# Patient Record
Sex: MALE | Race: WHITE | Employment: FULL TIME | URBAN - METROPOLITAN AREA
[De-identification: names, ages, dates, MRNs, and addresses within clinical notes are randomized per-mention and may not be internally consistent; named-entity substitution may affect disease eponyms.]

---

## 2016-12-22 LAB — DIABETIC RETINOPATHY: NORMAL

## 2018-02-22 ENCOUNTER — OFFICE VISIT (OUTPATIENT)
Dept: ENDOCRINOLOGY | Age: 28
End: 2018-02-22

## 2018-02-22 VITALS
HEIGHT: 67 IN | HEART RATE: 92 BPM | SYSTOLIC BLOOD PRESSURE: 121 MMHG | BODY MASS INDEX: 25.58 KG/M2 | DIASTOLIC BLOOD PRESSURE: 78 MMHG | WEIGHT: 163 LBS

## 2018-02-22 DIAGNOSIS — F32.A DEPRESSION, UNSPECIFIED DEPRESSION TYPE: ICD-10-CM

## 2018-02-22 DIAGNOSIS — E10.10 TYPE 1 DIABETES MELLITUS WITH KETOACIDOSIS WITHOUT COMA (HCC): ICD-10-CM

## 2018-02-22 DIAGNOSIS — E10.9 TYPE 1 DIABETES MELLITUS WITHOUT COMPLICATION (HCC): Primary | ICD-10-CM

## 2018-02-22 LAB — HBA1C MFR BLD: 10.1 %

## 2018-02-22 PROCEDURE — 83036 HEMOGLOBIN GLYCOSYLATED A1C: CPT | Performed by: INTERNAL MEDICINE

## 2018-02-22 PROCEDURE — 99215 OFFICE O/P EST HI 40 MIN: CPT | Performed by: INTERNAL MEDICINE

## 2018-02-22 ASSESSMENT — PATIENT HEALTH QUESTIONNAIRE - PHQ9
SUM OF ALL RESPONSES TO PHQ9 QUESTIONS 1 & 2: 0
1. LITTLE INTEREST OR PLEASURE IN DOING THINGS: 0
SUM OF ALL RESPONSES TO PHQ QUESTIONS 1-9: 0
2. FEELING DOWN, DEPRESSED OR HOPELESS: 0

## 2018-02-22 NOTE — PROGRESS NOTES
Floresita Andrade is a 24 y.o. male Pt is seen for the management of T 1 DM . 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 He has type 1 diabetes mellitus. MedicAlert identification noted. The initial diagnosis of diabetes was made 14 years ago. His disease course has been worsening. Hypoglycemia symptoms include nervousness/anxiousness, speech difficulty, sweats and tremors. Pertinent negatives for diabetes include no foot ulcerations, no polydipsia, no polyphagia, no polyuria, no visual change, no weakness and no weight loss. There are no hypoglycemic complications. Pertinent negatives for hypoglycemia complications include no nocturnal hypoglycemia, no required assistance and no required glucagon injection. Symptoms are worsening. Diabetic complications include peripheral neuropathy.  Risk factors for coronary artery disease include diabetes and Face: examination of head and face revealed no abnormalities  Eyes: no lid or conjunctival swelling, erythema or discharge, pupils are normal, equal, round, reactive to light  Ears/Nose: external inspection of ears and nose revealed no abnormalities, hearing is grossly normal  Oropharynx/Mouth/Face: lips, tongue and gums are normal with no lesions, the voice quality was normal  Neck: neck is supple and symmetric, with midline trachea and no masses, thyroid is normal  Lymphatics: normal cervical lymph nodes, normal supraclavicular nodes  Pulmonary: no increased work of breathing or signs of respiratory distress, lungs are clear to auscultation  Cardiovascular: normal heart rate and rhythm, normal S1 and S2, no murmurs and pedal pulses and 2+ bilaterally, No edema  Abdomen: abdomen is soft, non-tender with no masses  Musculoskeletal: normal gait and station and exam of the digits and nails are normal  Neurological: normal coordination and normal general cortical function    Visual inspection:  Deformity/amputation: absent  Skin lesions/pre-ulcerative calluses: absent  Edema: right- negative, left- negative    Sensory exam:  Monofilament sensation: normal  (minimum of 5 random plantar locations tested, avoiding callused areas - > 1 area with absence of sensation is + for neuropathy)    Plus at least one of the following:  Pulses: normal,   Pinprick: Intact  Proprioception: Intact  Vibration (128 Hz): Absent    Lab Results   Component Value Date    LABA1C 10.1 02/22/2018         ASSESSMENT/PLAN:  POORLY CONTROLLED TYPE 1 DIABETES A1C 14 >10 >>9.2>>10.5 >>8.6>>8.9>>10.1  I reviewed the insulin pump print outs including glycemic pattern at various times of the day. I also reviewed the insulin pump settings including basal rates, correction factors and meal carbohydrate coverage. Appropriate insulin pump adjustments were made to achieve optimal glycemic control.    since last visit he has not been checking his blood glucose as often as he did and his A1c has worsened from 8.9-10.1. He does have the new 670 G pump at home but he is still waiting on the sensors to be delivered once he gets those he will start wearing them together. Once he gets in the closed loop system I'm hopeful that his control will improve. He was advised to call back with his username and password for the [employer] system  Again we had a lengthy discussion about optimal control of diabetes   Hypoglycemia is not a major issue at this time   He will work towards better glycemia now and will reevaluate in 3 months   Also advised patient to check blood sugars before each meal, bedtime and any other time when a low blood sugar is suspected    EYE exam dec 2016 no retinopathy   Feet examined February 2018 minimal decreased in vibration sensation   Microalb/creat normal in nov 2016         VIt D Def level was 13 >>29 >>17>.23   Advised to  rx from pharmacy   He is on 5000 Iu daily advised to continue     DEPRESSION   He is not on meds any more and feels better now     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   He has been having fasiculation and twitching   He will see a neurologist             Reviewed and/or ordered clinical lab results Yes  Reviewed and/or ordered radiology tests Yes  Reviewed and/or ordered other diagnostic tests Yes  Made a decision to obtain old records Yes  Reviewed and summarized old records Yes      Katherin Armstrong was counseled regarding symptoms of current diagnosis, course and complications of disease if inadequately treated, side effects of medications, diagnosis, treatment options, and prognosis, risks, benefits, complications, and alternatives of treatment, labs, imaging and other studies and treatment targets and goals. He understands instructions and counseling. These diagnosis were discussed and reviewed with the patient including the advantages of drug therapy.  He was counseled at

## 2018-02-26 ENCOUNTER — TELEPHONE (OUTPATIENT)
Dept: ENDOCRINOLOGY | Age: 28
End: 2018-02-26

## 2018-02-26 ASSESSMENT — ENCOUNTER SYMPTOMS: VISUAL CHANGE: 0

## 2018-05-10 ENCOUNTER — TELEPHONE (OUTPATIENT)
Dept: ENDOCRINOLOGY | Age: 28
End: 2018-05-10

## 2018-05-22 ENCOUNTER — OFFICE VISIT (OUTPATIENT)
Dept: ENDOCRINOLOGY | Age: 28
End: 2018-05-22

## 2018-05-22 VITALS
WEIGHT: 170.6 LBS | HEART RATE: 83 BPM | OXYGEN SATURATION: 99 % | DIASTOLIC BLOOD PRESSURE: 86 MMHG | BODY MASS INDEX: 26.72 KG/M2 | SYSTOLIC BLOOD PRESSURE: 114 MMHG

## 2018-05-22 DIAGNOSIS — F32.A DEPRESSION, UNSPECIFIED DEPRESSION TYPE: ICD-10-CM

## 2018-05-22 DIAGNOSIS — E10.42 TYPE 1 DIABETES MELLITUS WITH DIABETIC POLYNEUROPATHY (HCC): Primary | ICD-10-CM

## 2018-05-22 DIAGNOSIS — E10.9 TYPE 1 DIABETES MELLITUS WITHOUT COMPLICATION (HCC): ICD-10-CM

## 2018-05-22 DIAGNOSIS — S14.3XXD INJURY OF LEFT BRACHIAL PLEXUS, SUBSEQUENT ENCOUNTER: ICD-10-CM

## 2018-05-22 DIAGNOSIS — Z79.4 ENCOUNTER FOR LONG-TERM (CURRENT) USE OF INSULIN (HCC): ICD-10-CM

## 2018-05-22 LAB
A/G RATIO: 1.5 (ref 1.1–2.2)
ALBUMIN SERPL-MCNC: 4.6 G/DL (ref 3.4–5)
ALP BLD-CCNC: 67 U/L (ref 40–129)
ALT SERPL-CCNC: 17 U/L (ref 10–40)
ANION GAP SERPL CALCULATED.3IONS-SCNC: 16 MMOL/L (ref 3–16)
AST SERPL-CCNC: 22 U/L (ref 15–37)
BILIRUB SERPL-MCNC: 2 MG/DL (ref 0–1)
BUN BLDV-MCNC: 11 MG/DL (ref 7–20)
CALCIUM SERPL-MCNC: 9.5 MG/DL (ref 8.3–10.6)
CHLORIDE BLD-SCNC: 104 MMOL/L (ref 99–110)
CHOLESTEROL, TOTAL: 150 MG/DL (ref 0–199)
CO2: 27 MMOL/L (ref 21–32)
CREAT SERPL-MCNC: 0.7 MG/DL (ref 0.9–1.3)
CREATININE URINE: 322.1 MG/DL (ref 39–259)
GFR AFRICAN AMERICAN: >60
GFR NON-AFRICAN AMERICAN: >60
GLOBULIN: 3 G/DL
GLUCOSE BLD-MCNC: 97 MG/DL (ref 70–99)
HDLC SERPL-MCNC: 38 MG/DL (ref 40–60)
LDL CHOLESTEROL CALCULATED: 95 MG/DL
MICROALBUMIN UR-MCNC: 2.8 MG/DL
MICROALBUMIN/CREAT UR-RTO: 8.7 MG/G (ref 0–30)
POTASSIUM SERPL-SCNC: 4.4 MMOL/L (ref 3.5–5.1)
SODIUM BLD-SCNC: 147 MMOL/L (ref 136–145)
TOTAL PROTEIN: 7.6 G/DL (ref 6.4–8.2)
TRIGL SERPL-MCNC: 85 MG/DL (ref 0–150)
VLDLC SERPL CALC-MCNC: 17 MG/DL

## 2018-05-22 PROCEDURE — 99215 OFFICE O/P EST HI 40 MIN: CPT | Performed by: INTERNAL MEDICINE

## 2018-05-22 RX ORDER — ACETAMINOPHEN 325 MG/1
650 TABLET ORAL EVERY 6 HOURS PRN
COMMUNITY

## 2018-05-22 ASSESSMENT — ENCOUNTER SYMPTOMS: VISUAL CHANGE: 0

## 2018-05-23 LAB
ESTIMATED AVERAGE GLUCOSE: 174.3 MG/DL
HBA1C MFR BLD: 7.7 %

## 2018-05-24 LAB — TSH, 3RD GENERATION: 1.06 MU/L (ref 0.3–4)

## 2018-05-26 LAB
VITAMIN D2 AND D3, TOTAL: 13.2 NG/ML (ref 30–80)
VITAMIN D2, 25 HYDROXY: <1 NG/ML
VITAMIN D3,25 HYDROXY: 13.2 NG/ML

## 2018-06-04 ENCOUNTER — TELEPHONE (OUTPATIENT)
Dept: ENDOCRINOLOGY | Age: 28
End: 2018-06-04

## 2018-09-24 ENCOUNTER — OFFICE VISIT (OUTPATIENT)
Dept: ENDOCRINOLOGY | Age: 28
End: 2018-09-24
Payer: COMMERCIAL

## 2018-09-24 VITALS
OXYGEN SATURATION: 99 % | HEIGHT: 67 IN | WEIGHT: 171 LBS | SYSTOLIC BLOOD PRESSURE: 120 MMHG | BODY MASS INDEX: 26.84 KG/M2 | DIASTOLIC BLOOD PRESSURE: 76 MMHG | HEART RATE: 112 BPM

## 2018-09-24 DIAGNOSIS — Z79.4 LONG-TERM INSULIN USE (HCC): ICD-10-CM

## 2018-09-24 DIAGNOSIS — E08.41 DIABETIC MONONEUROPATHY ASSOCIATED WITH DIABETES MELLITUS DUE TO UNDERLYING CONDITION (HCC): ICD-10-CM

## 2018-09-24 DIAGNOSIS — F32.0 CURRENT MILD EPISODE OF MAJOR DEPRESSIVE DISORDER WITHOUT PRIOR EPISODE (HCC): ICD-10-CM

## 2018-09-24 PROCEDURE — 99215 OFFICE O/P EST HI 40 MIN: CPT | Performed by: INTERNAL MEDICINE

## 2018-09-24 RX ORDER — LISINOPRIL 2.5 MG/1
2.5 TABLET ORAL DAILY
Qty: 30 TABLET | Refills: 3 | Status: SHIPPED | OUTPATIENT
Start: 2018-09-24 | End: 2019-04-29 | Stop reason: SDUPTHER

## 2018-09-24 RX ORDER — LISINOPRIL 2.5 MG/1
2.5 TABLET ORAL DAILY
Qty: 30 TABLET | Refills: 3 | Status: SHIPPED | OUTPATIENT
Start: 2018-09-24 | End: 2018-09-24 | Stop reason: SDUPTHER

## 2018-09-24 ASSESSMENT — ENCOUNTER SYMPTOMS: VISUAL CHANGE: 0

## 2018-09-24 NOTE — PROGRESS NOTES
Odin Scott. is a 34 y.o. male Pt is seen for the management of T 1 DM . Pt diagnosed with diabetes at age 17 years and has been wearing insulin pump since diagnosis He has a very strong family hx of T1 Diabetss His mom has T1 DM as well as his younger  brother who was diagnosed at age 6years of age. 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 He has type 1 diabetes mellitus. MedicAlert identification noted. The initial diagnosis of diabetes was made 14 years ago. His disease course has been worsening. Hypoglycemia symptoms include nervousness/anxiousness, speech difficulty, sweats and tremors. Pertinent negatives for diabetes include no foot ulcerations, no polydipsia, no polyphagia, no polyuria, no visual change, no weakness and no weight loss. There are no hypoglycemic complications. Pertinent negatives for hypoglycemia complications include no nocturnal hypoglycemia, no required assistance and no required glucagon injection. Symptoms are worsening. Diabetic complications include peripheral neuropathy. Risk factors for coronary artery disease include diabetes mellitus, dyslipidemia and male sex. Current diabetic treatment includes intensive insulin program. He is compliant with treatment some of the time. His weight is stable. He is following a diabetic diet. When asked about meal planning, he reported none. He has had a previous visit with a dietitian. He rarely participates in exercise. discussion about optimal control of diabetes   Hypoglycemia is not a major issue at this time   He will work towards better glycemia now and will reevaluate in 3 months   Also advised patient to check blood sugars before each meal, bedtime and any other time when a low blood sugar is suspected. Again advised patient to take me boluses at least 10 minutes prior to eating the meal     Hypoglycemia protocol reviewed in detail with patient Patient was advised to carry glucose tablets and also have glucagon emergency kit. Provided with RX for glucagon. Patient was advised that sending of his fingerstick blood glucose logs is crucial in management of his  diabetes. I will adjust the  dose of insulin according to sent data. Health Maintenance   - Blanca Never. was advised to have annual dilated eye exam     Last Eye Exam: dec 2016  he was told he has no retinopathy his ophthalmologist is their cousin --- advised him to make a follow-up appointment ASAP   Last Podiatry Exam:  I examined his feet in feb 2018 he has mild distal neuropathy   Lipids: At target in 2017   Microalbumin/Creatinine Ratio: normal May 2018    . Education: Reviewed ABCs of diabetes management (respective goals in parentheses): A1C (<7), blood pressure (<130/80), and cholesterol (LDL <100). -. Compliance at present is estimated to be fair. Efforts to improve compliance (if necessary) will be directed at dietary modifications: advised again .  -. Follow up: I recommend diabetes care be 3 months.     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 Please notify me in case of any questions about the content of this document, as some errors in transcription may have occurred . Eliezer Lopez. was counseled regarding symptoms of current diagnosis, course and complications of disease if inadequately treated, side effects of medications, diagnosis, treatment options, and prognosis, risks, benefits, complications, and alternatives of treatment, labs, imaging and other studies and treatment targets and goals. He understands instructions and counseling. These diagnosis were discussed and reviewed with the patient including the advantages of drug therapy. He was counseled at this visit on the following: diabetes complication prevention and foot care. Return in about 3 months (around 12/24/2018).

## 2018-09-26 ENCOUNTER — TELEPHONE (OUTPATIENT)
Dept: ENDOCRINOLOGY | Age: 28
End: 2018-09-26

## 2018-09-28 ENCOUNTER — TELEPHONE (OUTPATIENT)
Dept: ENDOCRINOLOGY | Age: 28
End: 2018-09-28

## 2019-01-10 DIAGNOSIS — E10.42 TYPE 1 DIABETES MELLITUS WITH DIABETIC POLYNEUROPATHY (HCC): ICD-10-CM

## 2019-01-10 LAB
A/G RATIO: 2 (ref 1.1–2.2)
ALBUMIN SERPL-MCNC: 5.1 G/DL (ref 3.4–5)
ALP BLD-CCNC: 94 U/L (ref 40–129)
ALT SERPL-CCNC: 24 U/L (ref 10–40)
ANION GAP SERPL CALCULATED.3IONS-SCNC: 15 MMOL/L (ref 3–16)
AST SERPL-CCNC: 15 U/L (ref 15–37)
BILIRUB SERPL-MCNC: 1.4 MG/DL (ref 0–1)
BUN BLDV-MCNC: 15 MG/DL (ref 7–20)
CALCIUM SERPL-MCNC: 10 MG/DL (ref 8.3–10.6)
CHLORIDE BLD-SCNC: 98 MMOL/L (ref 99–110)
CHOLESTEROL, TOTAL: 172 MG/DL (ref 0–199)
CO2: 25 MMOL/L (ref 21–32)
CREAT SERPL-MCNC: 0.8 MG/DL (ref 0.9–1.3)
CREATININE URINE: 109.1 MG/DL (ref 39–259)
GFR AFRICAN AMERICAN: >60
GFR NON-AFRICAN AMERICAN: >60
GLOBULIN: 2.6 G/DL
GLUCOSE BLD-MCNC: 264 MG/DL (ref 70–99)
HDLC SERPL-MCNC: 39 MG/DL (ref 40–60)
LDL CHOLESTEROL CALCULATED: 120 MG/DL
MICROALBUMIN UR-MCNC: <1.2 MG/DL
MICROALBUMIN/CREAT UR-RTO: NORMAL MG/G (ref 0–30)
POTASSIUM SERPL-SCNC: 4.4 MMOL/L (ref 3.5–5.1)
SODIUM BLD-SCNC: 138 MMOL/L (ref 136–145)
TOTAL PROTEIN: 7.7 G/DL (ref 6.4–8.2)
TRIGL SERPL-MCNC: 66 MG/DL (ref 0–150)
TSH SERPL DL<=0.05 MIU/L-ACNC: 1.16 UIU/ML (ref 0.27–4.2)
VITAMIN D 25-HYDROXY: 11.1 NG/ML
VLDLC SERPL CALC-MCNC: 13 MG/DL

## 2019-01-11 LAB
ESTIMATED AVERAGE GLUCOSE: 243.2 MG/DL
HBA1C MFR BLD: 10.1 %

## 2019-01-14 ENCOUNTER — OFFICE VISIT (OUTPATIENT)
Dept: ENDOCRINOLOGY | Age: 29
End: 2019-01-14
Payer: COMMERCIAL

## 2019-01-14 VITALS
HEART RATE: 94 BPM | BODY MASS INDEX: 27.15 KG/M2 | DIASTOLIC BLOOD PRESSURE: 80 MMHG | OXYGEN SATURATION: 99 % | WEIGHT: 173 LBS | SYSTOLIC BLOOD PRESSURE: 122 MMHG | HEIGHT: 67 IN

## 2019-01-14 DIAGNOSIS — F32.0 CURRENT MILD EPISODE OF MAJOR DEPRESSIVE DISORDER WITHOUT PRIOR EPISODE (HCC): ICD-10-CM

## 2019-01-14 DIAGNOSIS — E78.2 MIXED HYPERLIPIDEMIA: ICD-10-CM

## 2019-01-14 DIAGNOSIS — E55.9 VITAMIN D DEFICIENCY: ICD-10-CM

## 2019-01-14 PROCEDURE — 99215 OFFICE O/P EST HI 40 MIN: CPT | Performed by: INTERNAL MEDICINE

## 2019-01-14 RX ORDER — ERGOCALCIFEROL 1.25 MG/1
50000 CAPSULE ORAL WEEKLY
Qty: 12 CAPSULE | Refills: 1 | Status: SHIPPED | OUTPATIENT
Start: 2019-01-14 | End: 2019-04-29 | Stop reason: SDUPTHER

## 2019-01-14 ASSESSMENT — ENCOUNTER SYMPTOMS: VISUAL CHANGE: 0

## 2019-01-15 RX ORDER — LANCING DEVICE/LANCETS
KIT MISCELLANEOUS
Qty: 1 EACH | Refills: 1 | Status: SHIPPED | OUTPATIENT
Start: 2019-01-15 | End: 2022-02-16

## 2019-04-25 DIAGNOSIS — E78.2 MIXED HYPERLIPIDEMIA: ICD-10-CM

## 2019-04-25 DIAGNOSIS — E55.9 VITAMIN D DEFICIENCY: ICD-10-CM

## 2019-04-25 DIAGNOSIS — E10.42 TYPE 1 DIABETES MELLITUS WITH DIABETIC POLYNEUROPATHY (HCC): ICD-10-CM

## 2019-04-25 LAB
A/G RATIO: 1.5 (ref 1.1–2.2)
ALBUMIN SERPL-MCNC: 5 G/DL (ref 3.4–5)
ALP BLD-CCNC: 82 U/L (ref 40–129)
ALT SERPL-CCNC: 31 U/L (ref 10–40)
ANION GAP SERPL CALCULATED.3IONS-SCNC: 21 MMOL/L (ref 3–16)
AST SERPL-CCNC: 23 U/L (ref 15–37)
BILIRUB SERPL-MCNC: 1.4 MG/DL (ref 0–1)
BUN BLDV-MCNC: 11 MG/DL (ref 7–20)
CALCIUM SERPL-MCNC: 9.7 MG/DL (ref 8.3–10.6)
CHLORIDE BLD-SCNC: 97 MMOL/L (ref 99–110)
CHOLESTEROL, TOTAL: 167 MG/DL (ref 0–199)
CO2: 22 MMOL/L (ref 21–32)
CREAT SERPL-MCNC: 0.7 MG/DL (ref 0.9–1.3)
CREATININE URINE: 143 MG/DL (ref 39–259)
GFR AFRICAN AMERICAN: >60
GFR NON-AFRICAN AMERICAN: >60
GLOBULIN: 3.4 G/DL
GLUCOSE BLD-MCNC: 174 MG/DL (ref 70–99)
HDLC SERPL-MCNC: 42 MG/DL (ref 40–60)
LDL CHOLESTEROL CALCULATED: 107 MG/DL
MICROALBUMIN UR-MCNC: <1.2 MG/DL
MICROALBUMIN/CREAT UR-RTO: NORMAL MG/G (ref 0–30)
POTASSIUM SERPL-SCNC: 3.5 MMOL/L (ref 3.5–5.1)
SODIUM BLD-SCNC: 140 MMOL/L (ref 136–145)
TOTAL PROTEIN: 8.4 G/DL (ref 6.4–8.2)
TRIGL SERPL-MCNC: 89 MG/DL (ref 0–150)
TSH SERPL DL<=0.05 MIU/L-ACNC: 1.15 UIU/ML (ref 0.27–4.2)
VITAMIN D 25-HYDROXY: 51.1 NG/ML
VLDLC SERPL CALC-MCNC: 18 MG/DL

## 2019-04-26 LAB
ESTIMATED AVERAGE GLUCOSE: 228.8 MG/DL
HBA1C MFR BLD: 9.6 %

## 2019-04-27 LAB — TSH, 3RD GENERATION: 1.21 MU/L (ref 0.3–4)

## 2019-04-29 ENCOUNTER — OFFICE VISIT (OUTPATIENT)
Dept: ENDOCRINOLOGY | Age: 29
End: 2019-04-29
Payer: COMMERCIAL

## 2019-04-29 VITALS
DIASTOLIC BLOOD PRESSURE: 88 MMHG | HEIGHT: 67 IN | HEART RATE: 92 BPM | OXYGEN SATURATION: 99 % | WEIGHT: 175 LBS | SYSTOLIC BLOOD PRESSURE: 134 MMHG | BODY MASS INDEX: 27.47 KG/M2

## 2019-04-29 DIAGNOSIS — Z79.4 LONG-TERM INSULIN USE (HCC): ICD-10-CM

## 2019-04-29 DIAGNOSIS — E55.9 VITAMIN D DEFICIENCY: ICD-10-CM

## 2019-04-29 LAB
VITAMIN D2 AND D3, TOTAL: 70.2 NG/ML (ref 30–80)
VITAMIN D2, 25 HYDROXY: 65.5 NG/ML
VITAMIN D3,25 HYDROXY: 4.7 NG/ML

## 2019-04-29 PROCEDURE — 99214 OFFICE O/P EST MOD 30 MIN: CPT | Performed by: INTERNAL MEDICINE

## 2019-04-29 RX ORDER — LISINOPRIL 2.5 MG/1
2.5 TABLET ORAL DAILY
Qty: 90 TABLET | Refills: 3 | Status: SHIPPED | OUTPATIENT
Start: 2019-04-29 | End: 2020-07-09

## 2019-04-29 RX ORDER — ERGOCALCIFEROL 1.25 MG/1
50000 CAPSULE ORAL WEEKLY
Qty: 12 CAPSULE | Refills: 3 | Status: SHIPPED | OUTPATIENT
Start: 2019-04-29 | End: 2020-07-20 | Stop reason: SDUPTHER

## 2019-04-29 RX ORDER — ONDANSETRON 4 MG/1
4 TABLET, FILM COATED ORAL EVERY 8 HOURS PRN
COMMUNITY

## 2019-04-29 ASSESSMENT — ENCOUNTER SYMPTOMS: VISUAL CHANGE: 0

## 2019-04-29 NOTE — PROGRESS NOTES
Aftab Solis is a 34 y.o. male  seen for the management of T 1 DM . Pt diagnosed with diabetes at age 17 years and has been wearing insulin pump since diagnosis He has a very strong family hx of T1 Diabetss His mom has T1 DM as well as his younger  brother who was diagnosed at age 6years of age. 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 He has type 1 diabetes mellitus. MedicAlert identification noted. The initial diagnosis of diabetes was made 14 years ago. His disease course has been worsening. Hypoglycemia symptoms include nervousness/anxiousness, speech difficulty, sweats and tremors. Pertinent negatives for diabetes include no foot ulcerations, no polydipsia, no polyphagia, no polyuria, no visual change, no weakness and no weight loss. There are no hypoglycemic complications. Pertinent negatives for hypoglycemia complications include no nocturnal hypoglycemia, no required assistance and no required glucagon injection. Symptoms are worsening. Diabetic complications include peripheral neuropathy. Risk factors for coronary artery disease include diabetes mellitus, dyslipidemia and male sex. Current diabetic treatment includes intensive insulin program. He is compliant with treatment some of the time. His weight is stable. He is following a diabetic diet. When asked about meal planning, he reported none. He has had a previous visit with a dietitian.  He Not on file   Social Needs    Financial resource strain: Not on file    Food insecurity:     Worry: Not on file     Inability: Not on file    Transportation needs:     Medical: Not on file     Non-medical: Not on file   Tobacco Use    Smoking status: Passive Smoke Exposure - Never Smoker    Smokeless tobacco: Never Used    Tobacco comment: former smoker    Substance and Sexual Activity    Alcohol use: Yes     Comment: occ    Drug use: No    Sexual activity: Not on file   Lifestyle    Physical activity:     Days per week: Not on file     Minutes per session: Not on file    Stress: Not on file   Relationships    Social connections:     Talks on phone: Not on file     Gets together: Not on file     Attends Yarsani service: Not on file     Active member of club or organization: Not on file     Attends meetings of clubs or organizations: Not on file     Relationship status: Not on file    Intimate partner violence:     Fear of current or ex partner: Not on file     Emotionally abused: Not on file     Physically abused: Not on file     Forced sexual activity: Not on file   Other Topics Concern    Not on file   Social History Narrative    Not on file     Family History   Problem Relation Age of Onset    Diabetes Mother     Thyroid Disease Mother     Anxiety Disorder Mother     Cancer Mother         ovarian cancer    Other Mother         blood clot    Depression Father     Other Sister         GERD    Cancer Maternal Grandmother     Arthritis Maternal Grandmother     Anxiety Disorder Maternal Grandfather     High Blood Pressure Maternal Grandfather     Depression Maternal Grandfather     Anxiety Disorder Sister     Diabetes Sister      Current Outpatient Medications   Medication Sig Dispense Refill    Pyridoxine HCl (VITAMIN B6 PO) Take 1 tablet by mouth daily      Cyanocobalamin (VITAMIN B12 PO) Take 1 tablet by mouth daily      MAGNESIUM PO Take 1 tablet by mouth daily      L-methylfolate Calcium 15 MG TABS 0.5 tab daily  1    MELATONIN PO Take 1 tablet by mouth nightly      ondansetron (ZOFRAN) 4 MG tablet Take 4 mg by mouth every 8 hours as needed for Nausea or Vomiting      lisinopril (ZESTRIL) 2.5 MG tablet Take 1 tablet by mouth daily 90 tablet 3    vitamin D (ERGOCALCIFEROL) 30417 units CAPS capsule Take 1 capsule by mouth once a week 12 capsule 3    [employer] (ONE TOUCH DELICA LANCING DEV) MISC To check fingerstick blood glucose 1 each 1    acetaminophen (TYLENOL) 325 MG tablet Take 650 mg by mouth every 6 hours as needed for Pain      insulin aspart (NOVOLOG) 100 UNIT/ML injection vial Inject 150 units via insulin pump 9 vial 3    SELWYN CONTOUR NEXT TEST strip Test 6 times daily 550 strip 1    gabapentin (NEURONTIN) 600 MG tablet Take 600 mg by mouth 3 times daily      methocarbamol (ROBAXIN) 750 MG tablet Take 500 mg by mouth 4 times daily        No current facility-administered medications for this visit.       Allergies   Allergen Reactions    Citalopram      Worsening depression     Family Status   Relation Name Status    Mother  (Not Specified)    Father  (Not Specified)    Sister  (Not Specified)    MGM  (Not Specified)    MGF  (Not Specified)    Sister  (Not Specified)       Review of Systems  Constitutional: no fatigue, no fever, no recent weight gain, no recent weight loss, no changes in appetite  Eyes: no eye pain, no change in vision, no eye redness, no eye irritation, no double vision  Ears, nose, throat: no nasal congestion, no sore throat, no earache, no decrease in hearing, no hoarseness, no dry mouth, no sinus problems, no difficulty swallowing, no neck lumps, no dental problems, no mouth sores, no ringing in ears  Pulmonary: no shortness of breath, no wheezing, no dyspnea on exertion, no cough  Cardiovascular: no chest pain, no lower extremity edema, no orthopnea, no intermittent leg claudication, no palpitations  Gastrointestinal: no lips, tongue and gums are normal with no lesions, the voice quality was normal  Neck: neck is supple and symmetric, with midline trachea and no masses, thyroid is normal  Lymphatics: normal cervical lymph nodes, normal supraclavicular nodes  Pulmonary: no increased work of breathing or signs of respiratory distress, lungs are clear to auscultation  Cardiovascular: normal heart rate and rhythm, normal S1 and S2, no murmurs and pedal pulses and 2+ bilaterally, No edema  Abdomen: abdomen is soft, non-tender with no masses  Musculoskeletal: normal gait and station and exam of the digits and nails are normal  Neurological: normal coordination and normal general cortical function      Lab Results   Component Value Date    LABA1C 9.6 04/25/2019    LABA1C 10.1 01/10/2019    LABA1C 7.7 05/22/2018     Visual inspection:  Deformity/amputation: absent  Skin lesions/pre-ulcerative calluses: absent  Edema: right- negative, left- negative    Sensory exam:  Monofilament sensation: normal  (minimum of 5 random plantar locations tested, avoiding callused areas - > 1 area with absence of sensation is + for neuropathy)    Plus at least one of the following:  Pulses: normal,   Pinprick: Intact  Proprioception: Intact  Vibration (128 Hz): Intact    ASSESSMENT/PLAN:  POORLY CONTROLLED TYPE 1 DIABETES A1C 14 >10 >>9.2>>10.5>> >>8.6>>8.9>>10.1>>7.7>>9.0>>10.1>>9.6  I reviewed the insulin pump print outs including glycemic pattern at various times of the day. I also reviewed the insulin pump settings including basal rates, correction factors and meal carbohydrate coverage. Appropriate insulin pump adjustments were made to achieve optimal glycemic control  I have increased all his basal  rates Since he has not been wearing his sensor and is not in the auto mode he feels that he has been kicked out of automode due to highs .  Again we had a lengthy discussion about optimal control of diabetes   Hypoglycemia is not a major issue at this time twitching     Reviewed and/or ordered clinical lab results Yes  Reviewed and/or ordered radiology tests Yes  Reviewed and/or ordered other diagnostic tests Yes  Made a decision to obtain old records Yes  Reviewed and summarized old records Yes    Please note that some or all of this report was generated using voice recognition software. Please notify me in case of any questions about the content of this document, as some errors in transcription may have occurred . Betsy Divya was counseled regarding symptoms of current diagnosis, course and complications of disease if inadequately treated, side effects of medications, diagnosis, treatment options, and prognosis, risks, benefits, complications, and alternatives of treatment, labs, imaging and other studies and treatment targets and goals. He understands instructions and counseling. These diagnosis were discussed and reviewed with the patient including the advantages of drug therapy. He was counseled at this visit on the following: diabetes complication prevention and foot care. I spent 40   minutes with patient and more than 50 % of this time was spent discussing and providing counseling and coordinating care of patient's multiple health issues    Return in about 3 months (around 7/29/2019).

## 2019-05-22 ENCOUNTER — TELEPHONE (OUTPATIENT)
Dept: ENDOCRINOLOGY | Age: 29
End: 2019-05-22

## 2019-05-22 NOTE — TELEPHONE ENCOUNTER
Advised him to increase his boluses and set up a higher temp basal rate until fingerstick blood glucose get better   He already is feeling better

## 2019-05-22 NOTE — TELEPHONE ENCOUNTER
Pt called to see if he could speak to Nurse KILLIAN or Dr. Marcia Miguel. Pt's PCP put him on a steroid and it's causing his blood sugars to become to high. Pt states that he's having trouble getting them out of the 500 range. Informed patient that they will get a call back from nurse to disucss.

## 2019-05-22 NOTE — TELEPHONE ENCOUNTER
Pt was having an allergic reaction, red rash on half of his face. Given injection in pcp office, unsure of med name. Today blood glucose 553, 397, and 505. Pt is on taper of prednisone 10mg starting with 6 tabs yesterday and will take one less tablet each day. Pt does not have any other symptoms.

## 2019-08-08 ENCOUNTER — OFFICE VISIT (OUTPATIENT)
Dept: ENDOCRINOLOGY | Age: 29
End: 2019-08-08
Payer: COMMERCIAL

## 2019-08-08 VITALS
OXYGEN SATURATION: 99 % | HEIGHT: 67 IN | BODY MASS INDEX: 27.94 KG/M2 | HEART RATE: 95 BPM | DIASTOLIC BLOOD PRESSURE: 86 MMHG | SYSTOLIC BLOOD PRESSURE: 104 MMHG | WEIGHT: 178 LBS

## 2019-08-08 DIAGNOSIS — S14.3XXA INJURY OF LEFT BRACHIAL PLEXUS, INITIAL ENCOUNTER: ICD-10-CM

## 2019-08-08 DIAGNOSIS — F32.0 CURRENT MILD EPISODE OF MAJOR DEPRESSIVE DISORDER WITHOUT PRIOR EPISODE (HCC): ICD-10-CM

## 2019-08-08 DIAGNOSIS — E10.21 TYPE 1 DIABETES MELLITUS WITH NEPHROPATHY (HCC): Primary | ICD-10-CM

## 2019-08-08 LAB — HBA1C MFR BLD: 9.7 %

## 2019-08-08 PROCEDURE — 99214 OFFICE O/P EST MOD 30 MIN: CPT | Performed by: INTERNAL MEDICINE

## 2019-08-08 PROCEDURE — 83036 HEMOGLOBIN GLYCOSYLATED A1C: CPT | Performed by: INTERNAL MEDICINE

## 2019-08-08 ASSESSMENT — ENCOUNTER SYMPTOMS: VISUAL CHANGE: 0

## 2019-08-08 NOTE — PROGRESS NOTES
rarely participates in exercise. His breakfast blood glucose is taken between 8-9 am. His breakfast blood glucose range is generally 180-200 mg/dl. His lunch blood glucose range is generally >200 mg/dl. An ACE inhibitor/angiotensin II receptor blocker is not being taken. He does not see a podiatrist.Eye exam is current. --he has been struggling with C spine issues and was getting headaches he recently got x-ray on his C-spine and was prescribed tapering dose of steroids  He denies any unexplained hypoglycemia       Weight trend: stable  Prior visit with dietician: yes  Current diet: on average, 3 meals per day  Current exercise: rare    Has there been any hospitalization, surgery or major illness since the last visit? No   Has there been any new school, family or social problems since the last visit? No  Has there been any new family history of members with diabetes, heart disease, stroke, or endocrine related problems since the last visit?   No    Past Medical History:   Diagnosis Date    Anxiety     Brachial plexus injury     Depression     Diabetes mellitus (HCC)     Type 1    Insulin pump in place     Type 1 diabetes mellitus (HCC)     Wrist pain, acute     R wrist       Patient Active Problem List   Diagnosis    Brachial plexus injury, left    Community acquired pneumonia    Other specified counseling    Depression    Diabetes mellitus with ketoacidosis ([address]    Ketoacidosis in patient with type 1 diabetes mellitus ([address]    Encounter for long-term (current) use of insulin ([address]    Hyperglycemia    Myofascial pain    Mental disorder    Type 1 diabetes mellitus ([address]     Past Surgical History:   Procedure Laterality Date    HERNIA REPAIR      SHOULDER SURGERY       Social History     Socioeconomic History    Marital status: Single     Spouse name: Not on file    Number of children: Not on file    Years of education: Not on file    Highest education level: Not on file   Occupational History    Not on file   Social Needs    Financial resource strain: Not on file    Food insecurity:     Worry: Not on file     Inability: Not on file    Transportation needs:     Medical: Not on file     Non-medical: Not on file   Tobacco Use    Smoking status: Passive Smoke Exposure - Never Smoker    Smokeless tobacco: Never Used    Tobacco comment: former smoker    Substance and Sexual Activity    Alcohol use: Yes     Comment: occ    Drug use: No    Sexual activity: Not on file   Lifestyle    Physical activity:     Days per week: Not on file     Minutes per session: Not on file    Stress: Not on file   Relationships    Social connections:     Talks on phone: Not on file     Gets together: Not on file     Attends Yazidism service: Not on file     Active member of club or organization: Not on file     Attends meetings of clubs or organizations: Not on file     Relationship status: Not on file    Intimate partner violence:     Fear of current or ex partner: Not on file     Emotionally abused: Not on file     Physically abused: Not on file     Forced sexual activity: Not on file   Other Topics Concern    Not on file   Social History Narrative    Not on file     Family History   Problem Relation Age of Onset    Diabetes Mother     Thyroid Disease Mother     Anxiety Disorder Mother     Cancer Mother         ovarian cancer    Other Mother         blood clot    Depression Father     Other Sister         GERD    Cancer Maternal Grandmother     Arthritis Maternal Grandmother     Anxiety Disorder Maternal Grandfather     High Blood Pressure Maternal Grandfather     Depression Maternal Grandfather     Anxiety Disorder Sister     Diabetes Sister      Current Outpatient Medications   Medication Sig Dispense Refill    insulin aspart (NOVOLOG) 100 UNIT/ML injection vial INJECT 150 UNITS VIA       INSULIN PUMP.  DISCARD 28   DAYS AFTER FIRST USE 90 mL 3    Pyridoxine HCl (VITAMIN B6 PO) Take 1

## 2020-02-05 ENCOUNTER — TELEPHONE (OUTPATIENT)
Dept: ENDOCRINOLOGY | Age: 30
End: 2020-02-05

## 2020-07-20 ENCOUNTER — VIRTUAL VISIT (OUTPATIENT)
Dept: ENDOCRINOLOGY | Age: 30
End: 2020-07-20
Payer: COMMERCIAL

## 2020-07-20 ENCOUNTER — TELEPHONE (OUTPATIENT)
Dept: ENDOCRINOLOGY | Age: 30
End: 2020-07-20
Payer: COMMERCIAL

## 2020-07-20 PROCEDURE — 99214 OFFICE O/P EST MOD 30 MIN: CPT | Performed by: INTERNAL MEDICINE

## 2020-07-20 PROCEDURE — 95251 CONT GLUC MNTR ANALYSIS I&R: CPT | Performed by: INTERNAL MEDICINE

## 2020-07-20 RX ORDER — ERGOCALCIFEROL 1.25 MG/1
50000 CAPSULE ORAL WEEKLY
Qty: 12 CAPSULE | Refills: 3 | Status: SHIPPED | OUTPATIENT
Start: 2020-07-20 | End: 2021-08-30

## 2020-07-20 RX ORDER — CETIRIZINE HYDROCHLORIDE 10 MG/1
TABLET ORAL
COMMUNITY
Start: 2020-07-09

## 2020-07-20 ASSESSMENT — ENCOUNTER SYMPTOMS: VISUAL CHANGE: 0

## 2020-07-20 NOTE — PROGRESS NOTES
rarely participates in exercise. His breakfast blood glucose is taken between 8-9 am. His breakfast blood glucose range is generally 180-200 mg/dl. His lunch blood glucose range is generally >200 mg/dl. An ACE inhibitor/angiotensin II receptor blocker is not being taken. He does not see a podiatrist.Eye exam is current. Patient denies any significant change in his symptoms he has noticed high glucose. Patient has not been seen in the clinic since August 2019  --he has been struggling with C spine issues and was getting headaches he recently got x-ray on his C-spine and was prescribed tapering dose of steroids  He denies any unexplained hypoglycemia       Weight trend: stable  Prior visit with dietician: yes  Current diet: on average, 3 meals per day  Current exercise: rare    Has there been any hospitalization, surgery or major illness since the last visit? No   Has there been any new school, family or social problems since the last visit? No  Has there been any new family history of members with diabetes, heart disease, stroke, or endocrine related problems since the last visit?   No    Past Medical History:   Diagnosis Date    Anxiety     Brachial plexus injury     Depression     Diabetes mellitus (HCC)     Type 1    Insulin pump in place     Type 1 diabetes mellitus (HCC)     Wrist pain, acute     R wrist       Patient Active Problem List   Diagnosis    Brachial plexus injury, left    Community acquired pneumonia    Other specified counseling    Depression    Diabetes mellitus with ketoacidosis ([address]    Ketoacidosis in patient with type 1 diabetes mellitus ([address]    Encounter for long-term (current) use of insulin ([address]    Hyperglycemia    Myofascial pain    Mental disorder    Type 1 diabetes mellitus ([address]     Past Surgical History:   Procedure Laterality Date    HERNIA REPAIR      SHOULDER SURGERY       Social History     Socioeconomic History    Marital status: Single     Spouse name: Not on file    Number of children: Not on file    Years of education: Not on file    Highest education level: Not on file   Occupational History    Not on file   Social Needs    Financial resource strain: Not on file    Food insecurity     Worry: Not on file     Inability: Not on file    Transportation needs     Medical: Not on file     Non-medical: Not on file   Tobacco Use    Smoking status: Never Smoker    Smokeless tobacco: Never Used    Tobacco comment: former smoker    Substance and Sexual Activity    Alcohol use: Yes     Comment: occ    Drug use: No    Sexual activity: Not on file   Lifestyle    Physical activity     Days per week: Not on file     Minutes per session: Not on file    Stress: Not on file   Relationships    Social connections     Talks on phone: Not on file     Gets together: Not on file     Attends Cheondoism service: Not on file     Active member of club or organization: Not on file     Attends meetings of clubs or organizations: Not on file     Relationship status: Not on file    Intimate partner violence     Fear of current or ex partner: Not on file     Emotionally abused: Not on file     Physically abused: Not on file     Forced sexual activity: Not on file   Other Topics Concern    Not on file   Social History Narrative    Not on file     Family History   Problem Relation Age of Onset    Diabetes Mother     Thyroid Disease Mother     Anxiety Disorder Mother     Cancer Mother         ovarian cancer    Other Mother         blood clot    Depression Father     Other Sister         GERD    Cancer Maternal Grandmother     Arthritis Maternal Grandmother     Anxiety Disorder Maternal Grandfather     High Blood Pressure Maternal Grandfather     Depression Maternal Grandfather     Anxiety Disorder Sister     Diabetes Sister      Current Outpatient Medications   Medication Sig Dispense Refill    cetirizine (ZYRTEC) 10 MG tablet TK 1 T PO QD      vitamin D (ERGOCALCIFEROL) 1.25 MG (82749 UT) CAPS capsule Take 1 capsule by mouth once a week 12 capsule 3    lisinopril (PRINIVIL;ZESTRIL) 2.5 MG tablet TAKE 1 TABLET BY MOUTH DAILY 90 tablet 0    insulin aspart (NOVOLOG) 100 UNIT/ML injection vial INJECT 150 UNITS VIA       INSULIN PUMP. DISCARD 28   DAYS AFTER FIRST USE 90 mL 3    ondansetron (ZOFRAN) 4 MG tablet Take 4 mg by mouth every 8 hours as needed for Nausea or Vomiting      [employer] (ONE TOUCH DELICA LANCING DEV) MISC To check fingerstick blood glucose 1 each 1    acetaminophen (TYLENOL) 325 MG tablet Take 650 mg by mouth every 6 hours as needed for Pain      SELWYN CONTOUR NEXT TEST strip Test 6 times daily 550 strip 1    gabapentin (NEURONTIN) 600 MG tablet Take 600 mg by mouth 3 times daily      methocarbamol (ROBAXIN) 750 MG tablet Take 500 mg by mouth 4 times daily       insulin glargine (LANTUS) 100 UNIT/ML injection vial Inject 35 units into the skin daily for emergencies when pump failure. 1 vial 3    Insulin Syringe-Needle U-100 (B-D INSULIN SYRINGE 1CC/25GX1\") 25G X 1\" 1 ML MISC Use with testosterone shots weekly 4 each 5    L-methylfolate Calcium 15 MG TABS 0.5 tab daily  1     No current facility-administered medications for this visit.       Allergies   Allergen Reactions    Citalopram      Worsening depression     Family Status   Relation Name Status    Mother  (Not Specified)    Father  (Not Specified)    Sister  (Not Specified)    MGM  (Not Specified)    MGF  (Not Specified)    Sister  (Not Specified)       Review of Systems  Constitutional  Patient denies any weight loss denies any weight gain,  Eyes   Pt denies any double vision blurred vision or decreased peripheral vision  Head ear nose throat  Denies any trouble swallowing denies any hoarseness  Denies any shortness of breath denies  Cardiovascular  Denies any chest pain denies any palpitations currently  Gastrointestinal  Denies any nausea ,vomiting ,constipation or diarrhea  Hematological/lymphatic  Denies any blood clot denies or any painful lymph nodes  Genitourinary  Denies any frequent urination denies any nighttime urination  Skin  Denies any acne denies any changes in facial body hair denies any non healing wounds Musculoskeletal   denies any joint or bone pain ,denies any muscle weakness ,denies any new fracture  Endocrine  Denies any excessive thirst denies any excessive heat intolerance or cold intolerance . OBJECTIVE:  There were no vitals taken for this visit. Wt Readings from Last 3 Encounters:   08/08/19 178 lb (80.7 kg)   04/29/19 175 lb (79.4 kg)   01/14/19 173 lb (78.5 kg)         Constitutional: no acute distress, well appearing and well nourished  Psychiatric: oriented to person, place and time, judgement and insight and normal, recent and remote memory intact and mood and affect are normal  Skin: skin and subcutaneous tissue is normal without visible mass,   Head and Face: visual inspection  of head and face revealed no abnormalities  Eyes: visual inspection showed no lid or conjunctival swelling, erythema or discharge, pupils are normal, equal, round  Ears/Nose: external inspection of ears and nose revealed no abnormalities, hearing is grossly normal  Oropharynx/Mouth/Face: lips, tongue and gums appear  normal with no lesions, the voice quality was normal  Neck: neck appears symmetric, with no visible masses,   Pulmonary: no increased work of breathing or signs of respiratory distress,  Musculoskeletal: normal on inspection    Neurological: normal coordination and normal general cortical function. Lab Results   Component Value Date    LABA1C 9.7 08/08/2019    LABA1C 9.6 04/25/2019    LABA1C 10.1 01/10/2019       ASSESSMENT/PLAN:      POORLY CONTROLLED TYPE 1 DIABETES A1C 14 >10 >>9.2>>10.5>> >>8.6>>8.9>>10.1>>7.7>>9.0>>10.1>>9.6>>9.7  I reviewed the insulin pump print outs including glycemic pattern at various times of the day.  I also reviewed months    Microalbuminuria   Started low dose lisinopril, since repeat was normal and his blood pressure control is adequate we will stop lisinopril and will check microalbumin to creatinine ratio at follow-up  Again discussed in detail   Repeat MA was normal     Spinal arthrosis   He  is following with his PCP   He has been given a steroid taper     Hyperlipidemia   Last LDL was 120 >>100  Advised to work on diet and exercise     VIt D Def level was 13 >>29 >>17>.23 >>11>>51  Advised to  rx from pharmacy   He is on 5000 Iu daily advised to continue     DEPRESSION   He is not on meds any more and feels better now     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 Please notify me in case of any questions about the content of this document, as some errors in transcription may have occurred . Ron Morrison. was counseled regarding symptoms of current diagnosis, course and complications of disease if inadequately treated, side effects of medications, diagnosis, treatment options, and prognosis, risks, benefits, complications, and alternatives of treatment, labs, imaging and other studies and treatment targets and goals. He understands instructions and counseling. These diagnosis were discussed and reviewed with the patient including the advantages of drug therapy. He was counseled at this visit on the following: diabetes complication prevention and foot care.     TELEHEALTH EVALUATION -- Audio/Visual (During JHEKN-13 public health emergency)  Pursuant to the emergency declaration under the [facility] Act and the [address] authority and the [employer] [facility] General Act, this Virtual  Visit was conducted, with patient's consent, to reduce the patient's risk of exposure to COVID-19 and provide care for  patient. Services were provided through a video synchronous discussion virtually to substitute for in-person clinic visit. Patient's location : home     Patient provided verbal consent to use the video visit. Total time spent : 25 +Reviewing the chart, conducting an interview, performing a limited exam by video and educating the patient on my assessment plan. Return in about 3 months (around 10/20/2020).

## 2020-07-21 NOTE — TELEPHONE ENCOUNTER
Diabetes Continuous Glucose Monitoring Report         Reason for Study:     - improve diabetic control without risk of hypoglycemia       Current Medication regimen:        CGMS Report   CGMS data collection was performed on July 20, 2020  Patient provided information on his  diet, activities and insulin dosing  during this period. Data was available for 4  days     Sensor Data Report:   - 12 AM to 6 AM: Overnight blood glucose pattern shows elevated glucose  - 6   AM to 10 AM:  Post breakfast hyperglycemia is noted  --10AM to 5 PM : No hypoglycemia observed during this time.   - 5   PM to 8 PM: Post meal hyperglycemia is noted       Average reading 327 mg/dL     Standard Dev 66 mg/dL   % of time <70 mg/dL 0 %   % of time >180  mg/dL 72 %   % of time within range 28 %  Number of hypoglycemia episodes noted: 0     Impression:   CGMS shows elevated glucose overall     Recommendation:      Patient was advised to make the following changes in his diabetic regimen  Increase midnight basal rate to 1.55  Increase 7 AM basal rate 1.5  Increase 5 PM basal rate 1.55  Change carbohydrate to insulin ratio to 9 starting 5 AM and carbohydrate insulin ratio of 7 starting 4 PM

## 2020-07-24 ENCOUNTER — TELEPHONE (OUTPATIENT)
Dept: ENDOCRINOLOGY | Age: 30
End: 2020-07-24

## 2020-07-24 RX ORDER — SYRINGE AND NEEDLE,INSULIN,1ML 25GX1"
SYRINGE, EMPTY DISPOSABLE MISCELLANEOUS
Qty: 4 EACH | Refills: 5 | Status: SHIPPED | OUTPATIENT
Start: 2020-07-24

## 2020-07-24 RX ORDER — INSULIN GLARGINE 100 [IU]/ML
INJECTION, SOLUTION SUBCUTANEOUS
Qty: 1 VIAL | Refills: 3 | Status: SHIPPED | OUTPATIENT
Start: 2020-07-24 | End: 2022-02-16

## 2020-07-24 NOTE — TELEPHONE ENCOUNTER
Patient aware. He thinks he should be getting his new pump tomorrow. Patient will call office back if he has any questions or concerns.

## 2020-07-24 NOTE — TELEPHONE ENCOUNTER
PT called very concerned. He has a pump that is broken and pump  told him to stop using it. He states he cannot, unless he has Insulin and needles. He is requesting an Urgent script for insulin.  And a call back

## 2020-07-24 NOTE — TELEPHONE ENCOUNTER
Pt will need rx for Lantus called and he needs to start taking 35 units daily once he stop using the pump   He can use novolog or humalog whichever he has at home with each meal using the same carb to insulin ratio which is in his pump   Please inquire if he needs insulin syringes for his short acting insulin

## 2020-11-25 NOTE — TELEPHONE ENCOUNTER
Fax from [phone].  Refill request for Novolog 100 units    LOV    7-20-20  [facility]    none

## 2021-03-02 ENCOUNTER — TELEPHONE (OUTPATIENT)
Dept: ENDOCRINOLOGY | Age: 31
End: 2021-03-02

## 2021-03-02 NOTE — TELEPHONE ENCOUNTER
Fax from [facility] regarding a care consideration for the patient to consider a eye exam due to diabetes

## 2021-03-03 ENCOUNTER — TELEPHONE (OUTPATIENT)
Dept: ENDOCRINOLOGY | Age: 31
End: 2021-03-03

## 2021-03-03 DIAGNOSIS — Z79.4 LONG-TERM INSULIN USE (HCC): ICD-10-CM

## 2021-03-03 RX ORDER — LISINOPRIL 2.5 MG/1
2.5 TABLET ORAL DAILY
Qty: 90 TABLET | Refills: 0 | Status: SHIPPED | OUTPATIENT
Start: 2021-03-03 | End: 2021-08-23 | Stop reason: SDUPTHER

## 2021-08-23 ENCOUNTER — TELEPHONE (OUTPATIENT)
Dept: ENDOCRINOLOGY | Age: 31
End: 2021-08-23

## 2021-08-23 DIAGNOSIS — Z79.4 LONG-TERM INSULIN USE (HCC): ICD-10-CM

## 2021-08-23 RX ORDER — LISINOPRIL 2.5 MG/1
2.5 TABLET ORAL DAILY
Qty: 90 TABLET | Refills: 0 | Status: SHIPPED | OUTPATIENT
Start: 2021-08-23 | End: 2021-10-07 | Stop reason: SDUPTHER

## 2021-08-23 NOTE — TELEPHONE ENCOUNTER
PT needs to have a refill of Lisinopril 2.5mg sent to [facility] in [facility]. PT has scheduled an appt for 10/7 and he needs to know if he needs to have lab work done prior to the appt.

## 2021-10-01 LAB
CREATININE URINE: 87.1 MG/DL (ref 39–259)
MICROALBUMIN UR-MCNC: <1.2 MG/DL
MICROALBUMIN/CREAT UR-RTO: NORMAL MG/G (ref 0–30)

## 2021-10-07 ENCOUNTER — OFFICE VISIT (OUTPATIENT)
Dept: ENDOCRINOLOGY | Age: 31
End: 2021-10-07
Payer: COMMERCIAL

## 2021-10-07 VITALS
HEIGHT: 69 IN | WEIGHT: 181 LBS | DIASTOLIC BLOOD PRESSURE: 89 MMHG | RESPIRATION RATE: 16 BRPM | HEART RATE: 90 BPM | BODY MASS INDEX: 26.81 KG/M2 | SYSTOLIC BLOOD PRESSURE: 129 MMHG

## 2021-10-07 DIAGNOSIS — E55.9 VITAMIN D DEFICIENCY: ICD-10-CM

## 2021-10-07 DIAGNOSIS — Z79.4 LONG-TERM INSULIN USE (HCC): ICD-10-CM

## 2021-10-07 PROCEDURE — 99214 OFFICE O/P EST MOD 30 MIN: CPT | Performed by: INTERNAL MEDICINE

## 2021-10-07 PROCEDURE — 95251 CONT GLUC MNTR ANALYSIS I&R: CPT | Performed by: INTERNAL MEDICINE

## 2021-10-07 RX ORDER — LISINOPRIL 2.5 MG/1
2.5 TABLET ORAL DAILY
Qty: 90 TABLET | Refills: 1 | Status: SHIPPED | OUTPATIENT
Start: 2021-10-07 | End: 2022-06-15

## 2021-10-07 RX ORDER — DICLOFENAC SODIUM 75 MG/1
75 TABLET, DELAYED RELEASE ORAL 2 TIMES DAILY
COMMUNITY
Start: 2021-08-29 | End: 2022-06-22

## 2021-10-07 RX ORDER — ERGOCALCIFEROL 1.25 MG/1
CAPSULE ORAL
Qty: 12 CAPSULE | Refills: 2 | Status: SHIPPED | OUTPATIENT
Start: 2021-10-07

## 2021-10-07 ASSESSMENT — ENCOUNTER SYMPTOMS: VISUAL CHANGE: 0

## 2021-10-07 NOTE — PROGRESS NOTES
Tricia Encarnacion. is a 36 y.o. male  seen for the management of T 1 DM . Pt diagnosed with diabetes at age 17 years and has been wearing insulin pump since diagnosis He has a very strong family hx of T1 Diabetss His mom has T1 DM as well as his younger  brother who was diagnosed at age 6years of age. Shelton Najera was followed at [address] [place] [address] but apparently had poor control during his teen years   He was admitted to [facility]  in June 2015 with elevated blood sugar he recalls no source of infection was found  In summer 2016 he was referred to a psych and he tried various antidepressant and horrible Se from those and now is not taking any thing and feels better   --He was sent home from the [employer] job in December 2016 Due to uncontrolled diabetes and fluctuating  fingerstick blood glucose he has changed jobs since then     INTERIM:    Diabetes  He presents for his follow-up diabetic visit. He has type 1 diabetes mellitus. MedicAlert identification noted. The initial diagnosis of diabetes was made 14 years ago. His disease course has been worsening. Hypoglycemia symptoms include nervousness/anxiousness, speech difficulty, sweats and tremors. Pertinent negatives for diabetes include no foot ulcerations, no polydipsia, no polyphagia, no polyuria, no visual change, no weakness and no weight loss. There are no hypoglycemic complications. Pertinent negatives for hypoglycemia complications include no nocturnal hypoglycemia, no required assistance and no required glucagon injection. Symptoms are worsening. Diabetic complications include peripheral neuropathy. Risk factors for coronary artery disease include diabetes mellitus, dyslipidemia and male sex. Current diabetic treatment includes intensive insulin program. He is compliant with treatment some of the time. His weight is stable. He is following a diabetic diet. When asked about meal planning, he reported none. He has had a previous visit with a dietitian.  He rarely participates in exercise. His breakfast blood glucose is taken between 8-9 am. His breakfast blood glucose range is generally 180-200 mg/dl. His lunch blood glucose range is generally >200 mg/dl. An ACE inhibitor/angiotensin II receptor blocker is not being taken. He does not see a podiatrist.Eye exam is current. Patient denies any significant change in his symptoms he has noticed high glucose. Patient has not been seen in the clinic since August 2019  --he has been struggling with C spine issues and was getting headaches he recently got x-ray on his C-spine and was prescribed tapering dose of steroids  He denies any unexplained hypoglycemia       Weight trend: stable  Prior visit with dietician: yes  Current diet: on average, 3 meals per day  Current exercise: rare    Has there been any hospitalization, surgery or major illness since the last visit? No   Has there been any new school, family or social problems since the last visit? No  Has there been any new family history of members with diabetes, heart disease, stroke, or endocrine related problems since the last visit?   No    Past Medical History:   Diagnosis Date    Anxiety     Brachial plexus injury     Depression     Diabetes mellitus (HCC)     Type 1    Insulin pump in place     Type 1 diabetes mellitus (HCC)     Wrist pain, acute     R wrist       Patient Active Problem List   Diagnosis    Brachial plexus injury, left    Community acquired pneumonia    Other specified counseling    Depression    Diabetes mellitus with ketoacidosis ([address]    Ketoacidosis in patient with type 1 diabetes mellitus ([address]    Encounter for long-term (current) use of insulin ([address]    Hyperglycemia    Myofascial pain    Mental disorder    Type 1 diabetes mellitus ([address]     Past Surgical History:   Procedure Laterality Date    HERNIA REPAIR      SHOULDER SURGERY       Social History     Socioeconomic History    Marital status: Single     Spouse name: Diabetes Sister      Current Outpatient Medications   Medication Sig Dispense Refill    diclofenac (VOLTAREN) 75 MG EC tablet Take 75 mg by mouth 2 times daily      insulin aspart (NOVOLOG) 100 UNIT/ML injection vial INJECT 150 UNITS VIA       INSULIN PUMP 90 mL 3    vitamin D (ERGOCALCIFEROL) 1.25 MG (07447 UT) CAPS capsule TAKE 1 CAPSULE BY MOUTH EVERY WEEK 12 capsule 0    lisinopril (PRINIVIL;ZESTRIL) 2.5 MG tablet Take 1 tablet by mouth daily 90 tablet 0    Insulin Syringe-Needle U-100 (B-D INSULIN SYRINGE 1CC/25GX1\") 25G X 1\" 1 ML MISC Use with testosterone shots weekly 4 each 5    cetirizine (ZYRTEC) 10 MG tablet TK 1 T PO QD      ondansetron (ZOFRAN) 4 MG tablet Take 4 mg by mouth every 8 hours as needed for Nausea or Vomiting      Lancet Devices (ONE TOUCH DELICA LANCING DEV) MISC To check fingerstick blood glucose 1 each 1    acetaminophen (TYLENOL) 325 MG tablet Take 650 mg by mouth every 6 hours as needed for Pain      SELWYN CONTOUR NEXT TEST strip Test 6 times daily 550 strip 1    gabapentin (NEURONTIN) 600 MG tablet Take 600 mg by mouth 3 times daily      methocarbamol (ROBAXIN) 750 MG tablet Take 750 mg by mouth 3 times daily       insulin glargine (LANTUS) 100 UNIT/ML injection vial Inject 35 units into the skin daily for emergencies when pump failure. (Patient not taking: Reported on 10/7/2021) 1 vial 3     No current facility-administered medications for this visit. Allergies   Allergen Reactions    Citalopram      Worsening depression     Family Status   Relation Name Status    Mother  (Not Specified)    Father  (Not Specified)   [facility] Sister  (Not Specified)    MGM  (Not Specified)    MGF  (Not Specified)    Sister  (Not Specified)       Review of Systems  I have reviewed the review of system questionnaire filled by the patient .   Patient was advised to contact PCP for non endocrine signs and symptoms       OBJECTIVE:  /89   Pulse 90   Resp 16   Ht 5' 9\" (1.753 m)   Wt 181 lb (82.1 kg)   BMI 26.73 kg/m²    Wt Readings from Last 3 Encounters:   10/07/21 181 lb (82.1 kg)   08/08/19 178 lb (80.7 kg)   04/29/19 175 lb (79.4 kg)     Constitutional: no acute distress, well appearing, well nourished  Psychiatric: oriented to person, place and time, judgement, insight and normal, recent and remote memory and intact and mood, affect are normal  Skin: skin and subcutaneous tissue is normal without mass,   Head and Face: examination of head and face revealed no abnormalities  Eyes: no lid or conjunctival swelling, no erythema or discharge, pupils are normal,   Ears/Nose: external inspection of ears and nose revealed no abnormalities, hearing is grossly normal  Oropharynx/Mouth/Face: lips, tongue and gums are normal with no lesions, the voice quality was normal  Neck: neck is supple and symmetric, with midline trachea and no masses, thyroid is normal    Pulmonary: no increased work of breathing or signs of respiratory distress, lungs are clear to auscultation  Cardiovascular: normal heart rate and rhythm, normal S1 and S2,   Musculoskeletal: normal gait and station,   Neurological: normal coordination, normal general cortical function      Lab Results   Component Value Date    LABA1C 10.2 09/29/2021    LABA1C 9.7 08/08/2019    LABA1C 9.6 04/25/2019       ASSESSMENT/PLAN:      POORLY CONTROLLED TYPE 1 DIABETES A1C 14 >10 >>9.2>>10.5>> >>8.6>>8.9>>10.1>>7.7>>9.0>>10.1>>9.6>>9.7>>10.2     I had a lengthy discussion about uncontrolled diabetes and the complications associated with it  I reviewed the insulin pump print outs including glycemic pattern at various times of the day. I also reviewed the insulin pump settings including basal rates, correction factors and meal carbohydrate coverage. Appropriate insulin pump adjustments were made to achieve optimal glycemic control  All his basal rates were increased to accommodate for significant hyperglycemia over 24 hours.   --We will switch to 770 G insulin pump  Patient was also advised that in case of pump failure he would be needing 35 units of basal insulin and keep the same carbohydrate to insulin ratio that he is currently using for the short acting insulin. --- he was advised to start taking meal boluses 10 to 15 minutes before he eats on regular basis. .     Again we had a lengthy discussion about optimal control of diabetes   Hypoglycemia is not a major issue at this time     Hypoglycemia protocol reviewed in detail with patient Patient was advised to carry glucose tablets and also have glucagon emergency kit. Patient was advised that sending of his fingerstick blood glucose logs is crucial in management of his  diabetes. I will adjust the  dose of insulin according to sent data. Health Maintenance   - [facility]. was advised to have annual dilated eye exam     Last Eye Exam: 2019 mild retinopathy   Last Podiatry Exam: foot care discussed   Lipids: ldl 120 >>107>>103   Microalbumin/Creatinine Ratio: normal April 2019     . Education: Reviewed ABCs of diabetes management (respective goals in parentheses): A1C (<7), blood pressure (<130/80), and cholesterol (LDL <100). -. Compliance at present is estimated to be fair.  Efforts to improve compliance (if necessary) will be directed at dietary modifications: advised again .  -. Follow up: I recommend diabetes care be 3 months    Microalbuminuria   Started low dose lisinopril, microalbumin to creatinine ratio is now back to normal September 2021  Again discussed in detail   Repeat MA was normal     Spinal arthrosis   He  is following with his PCP   He has been given a steroid taper     Hyperlipidemia   Last LDL was 120 >>100>>103  Not on statins   Will work on diet   Advised to work on diet and exercise  We will consider statins if above target    VIt D Def level was 13 >>29 >>17>.23 >>11>>51  Advised to  rx from pharmacy   He is on 5000 Iu daily advised to continue     DEPRESSION He is not on meds any more and feels better now     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 Please notify me in case of any questions about the content of this document, as some errors in transcription may have occurred . Robert Lakshmi. was counseled regarding symptoms of current diagnosis, course and complications of disease if inadequately treated, side effects of medications, diagnosis, treatment options, and prognosis, risks, benefits, complications, and alternatives of treatment, labs, imaging and other studies and treatment targets and goals. He understands instructions and counseling. These diagnosis were discussed and reviewed with the patient including the advantages of drug therapy. He was counseled at this visit on the following: diabetes complication prevention and foot care. No follow-ups on file. Diabetes Continuous Glucose Monitoring Report         Reason for Study:     - improve diabetic control without risk of hypoglycemia       Current Medication regimen:   Currently on 670 G insulin pump     CGMS Report     CGMS data collection was performed on oct 7 , 2021   Patient provided information on his  diet, activities and insulin dosing  during this period. Data was available for 7   days     Sensor Data Report:   - 12 AM to 6 AM: Overnight blood glucose pattern shows stable glucs when in auto mode  - 6   AM to 10 AM:  Post breakfast hyperglycemia is noted  --10AM to 5 PM : No hypoglycemia observed during this time.   - 5   PM to 8 PM: Post meal hyperglycemia is noted Average reading 220 mg/dL     Standard Dev 81 mg/dL   % of time <70 mg/dL   0%   % of time >180  mg/dL 68%   % of time within range 32%  Number of hypoglycemia episodes noted: None     Impression:   CGMS shows overall hyperglycemia     Recommendation:      Patient was advised to make the following changes in his diabetic regimen  When patient is in auto mode better numbers are noted  Patient was advised to take meal boluses at least 10 minutes ahead of time that would avoid the hypoglycemia that is noted after meals which gets him kicked out of the auto mode  He is interested in getting the 770 model of the [employer] insulin pump he will contact his and call insurance

## 2021-10-18 ENCOUNTER — TELEPHONE (OUTPATIENT)
Dept: ENDOCRINOLOGY | Age: 31
End: 2021-10-18

## 2021-10-18 NOTE — TELEPHONE ENCOUNTER
Fax from [employer] regarding the request for CMN/RX, lab work, and diabetes related office notes from the last 2 - 3 months

## 2022-02-16 ENCOUNTER — OFFICE VISIT (OUTPATIENT)
Dept: ENDOCRINOLOGY | Age: 32
End: 2022-02-16
Payer: COMMERCIAL

## 2022-02-16 VITALS
RESPIRATION RATE: 16 BRPM | SYSTOLIC BLOOD PRESSURE: 131 MMHG | HEART RATE: 99 BPM | WEIGHT: 180.2 LBS | HEIGHT: 69 IN | DIASTOLIC BLOOD PRESSURE: 84 MMHG | BODY MASS INDEX: 26.69 KG/M2

## 2022-02-16 DIAGNOSIS — E55.9 VITAMIN D DEFICIENCY: ICD-10-CM

## 2022-02-16 DIAGNOSIS — I10 ESSENTIAL HYPERTENSION: ICD-10-CM

## 2022-02-16 LAB — HBA1C MFR BLD: 9.5 %

## 2022-02-16 PROCEDURE — 83036 HEMOGLOBIN GLYCOSYLATED A1C: CPT | Performed by: INTERNAL MEDICINE

## 2022-02-16 PROCEDURE — 99214 OFFICE O/P EST MOD 30 MIN: CPT | Performed by: INTERNAL MEDICINE

## 2022-02-16 RX ORDER — BLOOD SUGAR DIAGNOSTIC
STRIP MISCELLANEOUS DAILY
COMMUNITY

## 2022-02-16 ASSESSMENT — ENCOUNTER SYMPTOMS: VISUAL CHANGE: 0

## 2022-02-16 NOTE — PROGRESS NOTES
Lona Saab. is a 36 y.o. male  seen for the management of T 1 DM . Pt diagnosed with diabetes at age 17 years and has been wearing insulin pump since diagnosis He has a very strong family hx of T1 Diabetss His mom has T1 DM as well as his younger  brother who was diagnosed at age 6years of age. 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 He has type 1 diabetes mellitus. MedicAlert identification noted. The initial diagnosis of diabetes was made 14 years ago. His disease course has been worsening. Hypoglycemia symptoms include nervousness/anxiousness, speech difficulty, sweats and tremors. Pertinent negatives for diabetes include no foot ulcerations, no polydipsia, no polyphagia, no polyuria, no visual change, no weakness and no weight loss. There are no hypoglycemic complications. Pertinent negatives for hypoglycemia complications include no nocturnal hypoglycemia, no required assistance and no required glucagon injection. Symptoms are worsening. Diabetic complications include peripheral neuropathy. Risk factors for coronary artery disease include diabetes mellitus, dyslipidemia and male sex. Current diabetic treatment includes intensive insulin program. He is compliant with treatment some of the time. His weight is stable. He is following a diabetic diet. When asked about meal planning, he reported none. He has had a previous visit with a dietitian.  He rarely participates in exercise. His breakfast blood glucose is taken between 8-9 am. His breakfast blood glucose range is generally 180-200 mg/dl. His lunch blood glucose range is generally >200 mg/dl. An ACE inhibitor/angiotensin II receptor blocker is not being taken. He does not see a podiatrist.Eye exam is current. Had ankle fracture after a fall in Nov2021   He just started walking a week ago without a boot. He denies any unexplained hypoglycemia       Weight trend: stable  Prior visit with dietician: yes  Current diet: on average, 3 meals per day  Current exercise: rare    Has there been any hospitalization, surgery or major illness since the last visit? No   Has there been any new school, family or social problems since the last visit? No  Has there been any new family history of members with diabetes, heart disease, stroke, or endocrine related problems since the last visit?   No    Past Medical History:   Diagnosis Date    Anxiety     Brachial plexus injury     Depression     Diabetes mellitus (HCC)     Type 1    Insulin pump in place     Type 1 diabetes mellitus (HCC)     Wrist pain, acute     R wrist       Patient Active Problem List   Diagnosis    Brachial plexus injury, left    Community acquired pneumonia    Other specified counseling    Depression    Diabetes mellitus with ketoacidosis ([address]    Ketoacidosis in patient with type 1 diabetes mellitus ([address]    Encounter for long-term (current) use of insulin ([address]    Hyperglycemia    Myofascial pain    Mental disorder    Type 1 diabetes mellitus ([address]     Past Surgical History:   Procedure Laterality Date    ANKLE FRACTURE SURGERY Left     HERNIA REPAIR      SHOULDER SURGERY       Social History     Socioeconomic History    Marital status: Single     Spouse name: Not on file    Number of children: Not on file    Years of education: Not on file    Highest education level: Not on file   Occupational History    Not on file Tobacco Use    Smoking status: Never Smoker    Smokeless tobacco: Never Used    Tobacco comment: former smoker    Vaping Use    Vaping Use: Every day    Start date: 1/1/2015    Substances: Always (3mg/ml)   Substance and Sexual Activity    Alcohol use: Yes     Comment: occ    Drug use: No    Sexual activity: Not on file   Other Topics Concern    Not on file   Social History Narrative    Not on file     Social Determinants of Health     Financial Resource Strain:     Difficulty of Paying Living Expenses: Not on file   Food Insecurity:     Worried About Running Out of Food in the Last Year: Not on file    Abiodun of Food in the Last Year: Not on file   Transportation Needs:     Lack of Transportation (Medical): Not on file    Lack of Transportation (Non-Medical):  Not on file   Physical Activity:     Days of Exercise per Week: Not on file    Minutes of Exercise per Session: Not on file   Stress:     Feeling of Stress : Not on file   Social Connections:     Frequency of Communication with Friends and Family: Not on file    Frequency of Social Gatherings with Friends and Family: Not on file    Attends Alevism Services: Not on file    Active Member of Clubs or Organizations: Not on file    Attends Club or Organization Meetings: Not on file    Marital Status: Not on file   Intimate Partner Violence:     Fear of Current or Ex-Partner: Not on file    Emotionally Abused: Not on file    Physically Abused: Not on file    Sexually Abused: Not on file   Housing Stability:     Unable to Pay for Housing in the Last Year: Not on file    Number of [place] in the Last Year: Not on file    Unstable Housing in the Last Year: Not on file     Family History   Problem Relation Age of Onset    Diabetes Mother     Thyroid Disease Mother     Anxiety Disorder Mother     Cancer Mother         ovarian cancer    Other Mother         blood clot    Depression Father     Other Sister         GERD    Cancer Maternal Grandmother     Arthritis Maternal Grandmother     Anxiety Disorder Maternal Grandfather     High Blood Pressure Maternal Grandfather     Depression Maternal Grandfather     Anxiety Disorder Sister     Diabetes Sister      Current Outpatient Medications   Medication Sig Dispense Refill    blood glucose test strips (ACCU-CHEK GUIDE) strip by In Vitro route daily Check blood 4-6 times daily      diclofenac (VOLTAREN) 75 MG EC tablet Take 75 mg by mouth 2 times daily      vitamin D (ERGOCALCIFEROL) 1.25 MG (70757 UT) CAPS capsule TAKE 1 CAPSULE BY MOUTH EVERY WEEK 12 capsule 2    lisinopril (PRINIVIL;ZESTRIL) 2.5 MG tablet Take 1 tablet by mouth daily 90 tablet 1    insulin aspart (NOVOLOG) 100 UNIT/ML injection vial INJECT 150 UNITS VIA       INSULIN PUMP 90 mL 3    Insulin Syringe-Needle U-100 (B-D INSULIN SYRINGE 1CC/25GX1\") 25G X 1\" 1 ML MISC Use with testosterone shots weekly 4 each 5    cetirizine (ZYRTEC) 10 MG tablet TK 1 T PO QD      ondansetron (ZOFRAN) 4 MG tablet Take 4 mg by mouth every 8 hours as needed for Nausea or Vomiting      acetaminophen (TYLENOL) 325 MG tablet Take 650 mg by mouth every 6 hours as needed for Pain      gabapentin (NEURONTIN) 600 MG tablet Take 600 mg by mouth 3 times daily      methocarbamol (ROBAXIN) 750 MG tablet Take 750 mg by mouth 3 times daily        No current facility-administered medications for this visit. Allergies   Allergen Reactions    Citalopram      Worsening depression     Family Status   Relation Name Status    Mother  (Not Specified)    Father  (Not Specified)   Lia Kelley Sister  (Not Specified)    MGM  (Not Specified)    MGF  (Not Specified)    Sister  (Not Specified)       Review of Systems  I have reviewed the review of system questionnaire filled by the patient .   Patient was advised to contact PCP for non endocrine signs and symptoms       OBJECTIVE:  /84   Pulse 99   Resp 16   Ht 5' 9\" (1.753 m)   Wt 180 lb 3.2 oz (81.7 kg)   BMI 26.61 kg/m²    Wt Readings from Last 3 Encounters:   02/16/22 180 lb 3.2 oz (81.7 kg)   10/07/21 181 lb (82.1 kg)   08/08/19 178 lb (80.7 kg)     Constitutional: no acute distress, well appearing, well nourished  Psychiatric: oriented to person, place and time, judgement, insight and normal, recent and remote memory and intact and mood, affect are normal  Skin: skin and subcutaneous tissue is normal without mass,   Head and Face: examination of head and face revealed no abnormalities  Eyes: no lid or conjunctival swelling, no erythema or discharge, pupils are normal,   Ears/Nose: external inspection of ears and nose revealed no abnormalities, hearing is grossly normal  Oropharynx/Mouth/Face: lips, tongue and gums are normal with no lesions, the voice quality was normal  Neck: neck is supple and symmetric, with midline trachea and no masses, thyroid is normal    Pulmonary: no increased work of breathing or signs of respiratory distress, lungs are clear to auscultation  Cardiovascular: normal heart rate and rhythm, normal S1 and S2,   Musculoskeletal: normal gait and station,   Neurological: normal coordination, normal general cortical function      Lab Results   Component Value Date    LABA1C 9.5 02/16/2022    LABA1C 10.2 09/29/2021    LABA1C 9.7 08/08/2019       ASSESSMENT/PLAN:      POORLY CONTROLLED TYPE 1 DIABETES A1C 14 >10 >>9.2>>10.5>> >>8.6>>8.9>>10.1>>7.7>>9.0>>10.1>>9.6>>9.7>>10.2       AIC 9.5  Is not wearing sensor on 770 [employer] pump. Since fasting are above 200 will increase the basal rate   Will increase basal rate from MN 1.55 to 1.6     Patient was also advised that in case of pump failure he would be needing 35 units of basal insulin and keep the same carbohydrate to insulin ratio that he is currently using for the short acting insulin. --- he was advised to start taking meal boluses 10 to 15 minutes before he eats on regular basis. .     Again we had a lengthy discussion about optimal control of diabetes   Hypoglycemia is not a major issue at this time     Hypoglycemia protocol reviewed in detail with patient Patient was advised to carry glucose tablets and also have glucagon emergency kit. Patient was advised that sending of his fingerstick blood glucose logs is crucial in management of his  diabetes. I will adjust the  dose of insulin according to sent data. Health Maintenance   - [employer]. was advised to have annual dilated eye exam     Last Eye Exam: 2020 mild retinopathy   Last Podiatry Exam: foot care discussed   Lipids: ldl 120 >>107>>103   Microalbumin/Creatinine Ratio: normal April 2019     . Education: Reviewed ABCs of diabetes management (respective goals in parentheses): A1C (<7), blood pressure (<130/80), and cholesterol (LDL <100). -. Compliance at present is estimated to be fair.  Efforts to improve compliance (if necessary) will be directed at dietary modifications: advised again .  -. Follow up: I recommend diabetes care be 3 months    Microalbuminuria   Started low dose lisinopril, microalbumin to creatinine ratio is now back to normal September 2021  Again discussed in detail   Repeat MA was normal     Spinal arthrosis   He  is following with his PCP   He has been given a steroid taper     Hyperlipidemia   Last LDL was 120 >>100>>103  Not on statins   Will work on diet   Advised to work on diet and exercise  We will consider statins if above target    VIt D Def level was 13 >>29 >>17>.23 >>11>>51  Advised to  rx from pharmacy   He is on 5000 Iu daily advised to continue     DEPRESSION   He is not on meds any more and feels better now     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 Please notify me in case of any questions about the content of this document, as some errors in transcription may have occurred . Will MunozSharon was counseled regarding symptoms of current diagnosis, course and complications of disease if inadequately treated, side effects of medications, diagnosis, treatment options, and prognosis, risks, benefits, complications, and alternatives of treatment, labs, imaging and other studies and treatment targets and goals. He understands instructions and counseling. These diagnosis were discussed and reviewed with the patient including the advantages of drug therapy. He was counseled at this visit on the following: diabetes complication prevention and foot care. Return in about 3 months (around 5/16/2022).

## 2022-03-01 ENCOUNTER — TELEPHONE (OUTPATIENT)
Dept: ENDOCRINOLOGY | Age: 32
End: 2022-03-01

## 2022-03-01 DIAGNOSIS — E10.10 TYPE 1 DIABETES MELLITUS WITH KETOACIDOSIS WITHOUT COMA (HCC): Primary | ICD-10-CM

## 2022-06-14 DIAGNOSIS — Z79.4 LONG-TERM INSULIN USE (HCC): ICD-10-CM

## 2022-06-15 RX ORDER — LISINOPRIL 2.5 MG/1
2.5 TABLET ORAL DAILY
Qty: 90 TABLET | Refills: 1 | Status: SHIPPED | OUTPATIENT
Start: 2022-06-15

## 2022-06-18 DIAGNOSIS — I10 ESSENTIAL HYPERTENSION: ICD-10-CM

## 2022-06-18 DIAGNOSIS — E55.9 VITAMIN D DEFICIENCY: ICD-10-CM

## 2022-06-18 LAB
A/G RATIO: 2.1 (ref 1.1–2.2)
ALBUMIN SERPL-MCNC: 4.8 G/DL (ref 3.4–5)
ALP BLD-CCNC: 137 U/L (ref 40–129)
ALT SERPL-CCNC: 23 U/L (ref 10–40)
ANION GAP SERPL CALCULATED.3IONS-SCNC: 14 MMOL/L (ref 3–16)
AST SERPL-CCNC: 27 U/L (ref 15–37)
BILIRUB SERPL-MCNC: 0.7 MG/DL (ref 0–1)
BUN BLDV-MCNC: 12 MG/DL (ref 7–20)
CALCIUM SERPL-MCNC: 9.7 MG/DL (ref 8.3–10.6)
CHLORIDE BLD-SCNC: 100 MMOL/L (ref 99–110)
CHOLESTEROL, TOTAL: 153 MG/DL (ref 0–199)
CO2: 24 MMOL/L (ref 21–32)
CREAT SERPL-MCNC: 0.9 MG/DL (ref 0.9–1.3)
CREATININE URINE: 67.3 MG/DL (ref 39–259)
GFR AFRICAN AMERICAN: >60
GFR NON-AFRICAN AMERICAN: >60
GLUCOSE BLD-MCNC: 312 MG/DL (ref 70–99)
HDLC SERPL-MCNC: 36 MG/DL (ref 40–60)
LDL CHOLESTEROL CALCULATED: 94 MG/DL
MICROALBUMIN UR-MCNC: 1.3 MG/DL
MICROALBUMIN/CREAT UR-RTO: 19.3 MG/G (ref 0–30)
POTASSIUM SERPL-SCNC: 4.2 MMOL/L (ref 3.5–5.1)
SODIUM BLD-SCNC: 138 MMOL/L (ref 136–145)
TOTAL PROTEIN: 7.1 G/DL (ref 6.4–8.2)
TRIGL SERPL-MCNC: 115 MG/DL (ref 0–150)
TSH SERPL DL<=0.05 MIU/L-ACNC: 0.81 UIU/ML (ref 0.27–4.2)
VITAMIN D 25-HYDROXY: 52.2 NG/ML
VLDLC SERPL CALC-MCNC: 23 MG/DL

## 2022-06-19 LAB
ESTIMATED AVERAGE GLUCOSE: 220.2 MG/DL
HBA1C MFR BLD: 9.3 %

## 2022-06-22 ENCOUNTER — OFFICE VISIT (OUTPATIENT)
Dept: ENDOCRINOLOGY | Age: 32
End: 2022-06-22
Payer: COMMERCIAL

## 2022-06-22 VITALS
WEIGHT: 189 LBS | SYSTOLIC BLOOD PRESSURE: 124 MMHG | HEIGHT: 69 IN | HEART RATE: 80 BPM | DIASTOLIC BLOOD PRESSURE: 79 MMHG | BODY MASS INDEX: 27.99 KG/M2 | RESPIRATION RATE: 16 BRPM

## 2022-06-22 PROCEDURE — 95251 CONT GLUC MNTR ANALYSIS I&R: CPT | Performed by: INTERNAL MEDICINE

## 2022-06-22 PROCEDURE — 99214 OFFICE O/P EST MOD 30 MIN: CPT | Performed by: INTERNAL MEDICINE

## 2022-06-22 PROCEDURE — 3046F HEMOGLOBIN A1C LEVEL >9.0%: CPT | Performed by: INTERNAL MEDICINE

## 2022-06-22 RX ORDER — GLUCAGON INJECTION, SOLUTION 1 MG/.2ML
1 INJECTION, SOLUTION SUBCUTANEOUS PRN
Qty: 2 EACH | Refills: 3 | Status: SHIPPED | OUTPATIENT
Start: 2022-06-22

## 2022-06-22 ASSESSMENT — ENCOUNTER SYMPTOMS: VISUAL CHANGE: 0

## 2022-06-22 NOTE — PROGRESS NOTES
Haven Pete is a 29 y.o. male  seen for the management of T 1 DM . Pt diagnosed with diabetes at age 17 years and has been wearing insulin pump since diagnosis He has a very strong family hx of T1 Diabetss His mom has T1 DM as well as his younger  brother who was diagnosed at age 6years of age. [address] was followed at [address] [place] [address] but apparently had poor control during his teen years   He was [facility] to [facility]  in June 2015 with elevated blood sugar he recalls no source of infection was found  In summer 2016 he was referred to a psych and he tried various antidepressant and horrible Se from those and now is not taking any thing and feels better   --He was sent home from the [employer] job in December 2016 Due to uncontrolled diabetes and fluctuating  fingerstick blood glucose he has changed jobs since then     INTERIM:    Diabetes  He presents for his follow-up diabetic visit. He has type 1 diabetes mellitus. MedicAlert identification noted. The initial diagnosis of diabetes was made 14 years ago. His disease course has been worsening. Hypoglycemia symptoms include nervousness/anxiousness, speech difficulty, sweats and tremors. Pertinent negatives for diabetes include no foot ulcerations, no polydipsia, no polyphagia, no polyuria, no visual change, no weakness and no weight loss. There are no hypoglycemic complications. Pertinent negatives for hypoglycemia complications include no nocturnal hypoglycemia, no required assistance and no required glucagon injection. Symptoms are worsening. Diabetic complications include peripheral neuropathy. Risk factors for coronary artery disease include diabetes mellitus, dyslipidemia and male sex. Current diabetic treatment includes intensive insulin program. He is compliant with treatment some of the time. His weight is stable. He is following a diabetic diet. When asked about meal planning, he reported none. He has had a previous visit with a dietitian.  He rarely participates in exercise. His breakfast blood glucose is taken between 8-9 am. His breakfast blood glucose range is generally 180-200 mg/dl. His lunch blood glucose range is generally >200 mg/dl. An ACE inhibitor/angiotensin II receptor blocker is not being taken. He does not see a podiatrist.Eye exam is current. He denies any unexplained hypoglycemia   Notes high glucose throughout the day, feels like the issues with the sensor calibration and the sensor taking out of auto mode have been affecting his diabetes control. Does not take meal boluses typically 10 minutes prior to eating advised to do so    Weight trend: stable  Prior visit with dietician: yes  Current diet: on average, 3 meals per day  Current exercise: rare    Has there been any hospitalization, surgery or major illness since the last visit? No   Has there been any new school, family or social problems since the last visit? No  Has there been any new family history of members with diabetes, heart disease, stroke, or endocrine related problems since the last visit?   No    Past Medical History:   Diagnosis Date    Anxiety     Brachial plexus injury     Depression     Diabetes mellitus (HCC)     Type 1    Insulin pump in place     Type 1 diabetes mellitus (HCC)     Wrist pain, acute     R wrist       Patient Active Problem List   Diagnosis    Brachial plexus injury, left    Community acquired pneumonia    Other specified counseling    Depression    Diabetes mellitus with ketoacidosis ([address]    Ketoacidosis in patient with type 1 diabetes mellitus ([address]    Encounter for long-term (current) use of insulin ([address]    Hyperglycemia    Myofascial pain    Mental disorder    Type 1 diabetes mellitus ([address]     Past Surgical History:   Procedure Laterality Date    ANKLE FRACTURE SURGERY Left     HERNIA REPAIR      SHOULDER SURGERY       Social History     Socioeconomic History    Marital status: Single     Spouse name: Not on file  Number of children: Not on file    Years of education: Not on file    Highest education level: Not on file   Occupational History    Not on file   Tobacco Use    Smoking status: Never Smoker    Smokeless tobacco: Never Used    Tobacco comment: former smoker    Vaping Use    Vaping Use: Every day    Start date: 1/1/2015    Substances: Always (3mg/ml)   Substance and Sexual Activity    Alcohol use: Yes     Comment: occ    Drug use: No    Sexual activity: Not on file   Other Topics Concern    Not on file   Social History Narrative    Not on file     Social Determinants of Health     Financial Resource Strain:     Difficulty of Paying Living Expenses: Not on file   Food Insecurity:     Worried About Running Out of Food in the Last Year: Not on file    Abiodun of Food in the Last Year: Not on file   Transportation Needs:     Lack of Transportation (Medical): Not on file    Lack of Transportation (Non-Medical):  Not on file   Physical Activity:     Days of Exercise per Week: Not on file    Minutes of Exercise per Session: Not on file   Stress:     Feeling of Stress : Not on file   Social Connections:     Frequency of Communication with Friends and Family: Not on file    Frequency of Social Gatherings with Friends and Family: Not on file    Attends Religion Services: Not on file    Active Member of [address] [place] [address] [employer] or Organizations: Not on file    Attends Club or Organization Meetings: Not on file    Marital Status: Not on file   Intimate Partner Violence:     Fear of Current or Ex-Partner: Not on file    Emotionally Abused: Not on file    Physically Abused: Not on file    Sexually Abused: Not on file   Housing Stability:     Unable to Pay for Housing in the Last Year: Not on file    Number of [place] in the Last Year: Not on file    Unstable Housing in the Last Year: Not on file     Family History   Problem Relation Age of Onset    Diabetes Mother     Thyroid Disease Mother     Anxiety Disorder Mother     Cancer Mother         ovarian cancer    Other Mother         blood clot    Depression Father     Other Sister         GERD    Cancer Maternal Grandmother     Arthritis Maternal Grandmother     Anxiety Disorder Maternal Grandfather     High Blood Pressure Maternal Grandfather     Depression Maternal Grandfather     Anxiety Disorder Sister     Diabetes Sister      Current Outpatient Medications   Medication Sig Dispense Refill    Glucagon (GVOKE PFS) 1 MG/0.2ML SOSY Inject 1 mg into the skin as needed (to be used in case of hypoglycemia) 2 each 3    lisinopril (PRINIVIL;ZESTRIL) 2.5 MG tablet TAKE 1 TABLET BY MOUTH DAILY 90 tablet 1    blood glucose test strips (ACCU-CHEK GUIDE) strip by In Vitro route daily Check blood 4-6 times daily      vitamin D (ERGOCALCIFEROL) 1.25 MG (47017 UT) CAPS capsule TAKE 1 CAPSULE BY MOUTH EVERY WEEK 12 capsule 2    insulin aspart (NOVOLOG) 100 UNIT/ML injection vial INJECT 150 UNITS VIA       INSULIN PUMP 90 mL 3    Insulin Syringe-Needle U-100 (B-D INSULIN SYRINGE 1CC/25GX1\") 25G X 1\" 1 ML MISC Use with testosterone shots weekly 4 each 5    cetirizine (ZYRTEC) 10 MG tablet TK 1 T PO QD      ondansetron (ZOFRAN) 4 MG tablet Take 4 mg by mouth every 8 hours as needed for Nausea or Vomiting      acetaminophen (TYLENOL) 325 MG tablet Take 650 mg by mouth every 6 hours as needed for Pain      gabapentin (NEURONTIN) 600 MG tablet Take 600 mg by mouth 3 times daily      methocarbamol (ROBAXIN) 750 MG tablet Take 750 mg by mouth 3 times daily        No current facility-administered medications for this visit.      Allergies   Allergen Reactions    Citalopram      Worsening depression     Family Status   Relation Name Status    Mother  (Not Specified)    Father  (Not Specified)   Kofi Hickman Sister  (Not Specified)    MGM  (Not Specified)    MGF  (Not Specified)    Sister  (Not Specified)       Review of Systems  I have reviewed the review of system questionnaire filled by the patient .   Patient was advised to contact PCP for non endocrine signs and symptoms       OBJECTIVE:  /79   Pulse 80   Resp 16   Ht 5' 9\" (1.753 m)   Wt 189 lb (85.7 kg)   BMI 27.91 kg/m²    Wt Readings from Last 3 Encounters:   06/22/22 189 lb (85.7 kg)   02/16/22 180 lb 3.2 oz (81.7 kg)   10/07/21 181 lb (82.1 kg)     Constitutional: no acute distress, well appearing, well nourished  Psychiatric: oriented to person, place and time, judgement, insight and normal, recent and remote memory and intact and mood, affect are normal  Skin: skin and subcutaneous tissue is normal without mass,   Head and Face: examination of head and face revealed no abnormalities  Eyes: no lid or conjunctival swelling, no erythema or discharge, pupils are normal,   Ears/Nose: external inspection of ears and nose revealed no abnormalities, hearing is grossly normal  Oropharynx/Mouth/Face: lips, tongue and gums are normal with no lesions, the voice quality was normal  Neck: neck is supple and symmetric, with midline trachea and no masses, thyroid is normal    Pulmonary: no increased work of breathing or signs of respiratory distress, lungs are clear to auscultation  Cardiovascular: normal heart rate and rhythm, normal S1 and S2,   Musculoskeletal: normal gait and station,   Neurological: normal coordination, normal general cortical function      Lab Results   Component Value Date    LABA1C 9.3 06/18/2022    LABA1C 9.5 02/16/2022    LABA1C 10.2 09/29/2021       ASSESSMENT/PLAN:      POORLY CONTROLLED TYPE 1 DIABETES A1C 14 >10 >>9.2>>10.5>> >>8.6>>8.9>>10.1>>7.7>>9.0>>10.1>>9.6>>9.7>>10.2     AIC 9.5--patient has poorly controlled diabetes  I reviewed the pump download as well as the CGM S data with him in detail and I have recommended the following changes in addition to taking corrective boluses for hyperglycemia he will make these changes  Change active insulin time to 2.5   change sensitivity to 40 Start bolusing for coffee   Patient was also advised that in case of pump failure he would be needing 35 units of basal insulin and keep the same carbohydrate to insulin ratio that he is currently using for the short acting insulin. --- he was advised to start taking meal boluses 10 to 15 minutes before he eats on regular basis. .     Again we had a lengthy discussion about optimal control of diabetes   Hypoglycemia is not a major issue at this time     Hypoglycemia protocol reviewed in detail with patient Patient was advised to carry glucose tablets and also have glucagon emergency kit. Patient was advised that sending of his fingerstick blood glucose logs is crucial in management of his  diabetes. I will adjust the  dose of insulin according to sent data. Health Maintenance   - Valenciamartin Galeas. was advised to have annual dilated eye exam     Last Eye Exam: 2020 mild retinopathy   Last Podiatry Exam: foot care discussed   Lipids: ldl 120 >>107>>103   Microalbumin/Creatinine Ratio: normal April 2019     . Education: Reviewed ABCs of diabetes management (respective goals in parentheses): A1C (<7), blood pressure (<130/80), and cholesterol (LDL <100). -. Compliance at present is estimated to be fair.  Efforts to improve compliance (if necessary) will be directed at dietary modifications: advised again .  -. Follow up: I recommend diabetes care be 3 months    Microalbuminuria   Started low dose lisinopril, microalbumin to creatinine ratio is now back to normal September 2021  Again discussed in detail   Repeat MA was normal     Spinal arthrosis   He  is following with his PCP     Hyperlipidemia   Last LDL was 120 >>100>>103  Not on statins   Will work on diet   Advised to work on diet and exercise  We will consider statins if above target    VIt D Def level was 13 >>29 >>17>.23 >>11>>51  Advised to  rx from pharmacy   He is on 5000 Iu daily advised to continue     DEPRESSION   He is not on meds any more and feels better now     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 Please notify me in case of any questions about the content of this document, as some errors in transcription may have occurred . Rupa Ordaz. was counseled regarding symptoms of current diagnosis, course and complications of disease if inadequately treated, side effects of medications, diagnosis, treatment options, and prognosis, risks, benefits, complications, and alternatives of treatment, labs, imaging and other studies and treatment targets and goals. He understands instructions and counseling. Diabetes Continuous Glucose Monitoring Report         Reason for Study:     - improve diabetic control without risk of hypoglycemia       Current Medication regimen:   automode 45 %   770 pump      CGMS Report     CGMS data collection was performed on June 22, 2022   Patient provided information on his  diet, activities and insulin dosing  during this period. Data was available for 14  days     Sensor Data Report:   - 12 AM to 6 AM: Overnight blood glucose pattern shows high due to meal related excursion   - 6   AM to 10 AM:  Post breakfast hyperglycemia   is noted  --10AM to 5 PM :  No hypoglycemia observed during this time.   - 5   PM to 8 PM: Post meal hyperglycemia  is noted       Average reading 238   mg/dL     Standard Dev  80   mg/dL   % of time <70 mg/dL0   %   % of time >180  mg/dL 75 %   % of time within range 25  %  Number of hypoglycemia episodes noted: 0 Impression:   CGMS shows hyperglycemia with significant postprandial hyperglycemia     Recommendation:      Patient was advised to make the following changes in his diabetic regimen  Change Active insulin to 2:30   Sensitivity change to 40 from 45           These diagnosis were discussed and reviewed with the patient including the advantages of drug therapy. He was counseled at this visit on the following: diabetes complication prevention and foot care. Return in about 4 months (around 10/22/2022).

## 2022-10-02 NOTE — TELEPHONE ENCOUNTER
Diabetes Continuous Glucose Monitoring Report         Reason for Study:     - improve diabetic control without risk of hypoglycemia       Current Medication regimen:        CGMS Report     CGMS data collection was performed on March 1, 2022  Patient provided information on his  diet, activities and insulin dosing  during this period. Data was available for 7 days     Sensor Data Report:   - 12 AM to 6 AM: Overnight blood glucose pattern shows stable glycemia when in auto mode. - 6   AM to 10 AM:  Post breakfast hypoglycemia is noted  --10AM to 5 PM : No hypoglycemia observed during this time. - 5   PM to 8 PM: Post meal hyperglycemia is noted       Average reading 281 mg/dL     Standard Dev 69 mg/dL   % of time <70 mg/dL 0%   % of time >180  mg/dL 54%   % of time within range 46%  Number of hypoglycemia episodes noted: 0     Impression:   CGMS shows stable glycemia overnight and he stays in the auto mode with minimal glycemic excursions after meals.      Recommendation:      Patient was advised to make the following changes in his diabetic regimen  Spend more time in auto mode   Increase 5 PM basal rate to 1.6
[employer] 2/15/22-2/28/22
Patient aware.
Male

## 2022-10-17 ENCOUNTER — TELEPHONE (OUTPATIENT)
Dept: ENDOCRINOLOGY | Age: 32
End: 2022-10-17

## 2022-10-17 DIAGNOSIS — E10.10 TYPE 1 DIABETES MELLITUS WITH KETOACIDOSIS WITHOUT COMA (HCC): Primary | ICD-10-CM

## 2022-10-17 NOTE — TELEPHONE ENCOUNTER
Pt calling and states his insurance does not want to cover Novolog that insurance prefers Humalog. Insurance might cover if Prior [place] is done for [employer].  Pt states he's been taking Novolog since he's 17yrs old and if the Dr is ok w/ him switching to Humalog then we can switch it    Pt states he has a new mail order pharmacy its called [facility] and he provided a # [phone] for refill request    If Prior Auth he provided # [phone]    CB# [phone]

## 2022-10-20 ENCOUNTER — TELEPHONE (OUTPATIENT)
Dept: ENDOCRINOLOGY | Age: 32
End: 2022-10-20

## 2022-10-20 NOTE — TELEPHONE ENCOUNTER
Pt calling and states his rx for Humalog should have went to [address] [facility]    # [phone]

## 2022-10-24 NOTE — TELEPHONE ENCOUNTER
Approved on October 22  [place] Case: [id number], Status: Approved, Coverage Starts on: 10/22/2022 12:00:00 AM, Coverage Ends on: 10/22/2023 12:00:00 AM.

## 2022-11-08 LAB
A/G RATIO: 2.2 (ref 1.1–2.2)
ALBUMIN SERPL-MCNC: 4.6 G/DL (ref 3.4–5)
ALP BLD-CCNC: 104 U/L (ref 40–129)
ALT SERPL-CCNC: 22 U/L (ref 10–40)
ANION GAP SERPL CALCULATED.3IONS-SCNC: 11 MMOL/L (ref 3–16)
AST SERPL-CCNC: 24 U/L (ref 15–37)
BILIRUB SERPL-MCNC: 1.1 MG/DL (ref 0–1)
BUN BLDV-MCNC: 13 MG/DL (ref 7–20)
CALCIUM SERPL-MCNC: 9.3 MG/DL (ref 8.3–10.6)
CHLORIDE BLD-SCNC: 103 MMOL/L (ref 99–110)
CHOLESTEROL, TOTAL: 139 MG/DL (ref 0–199)
CO2: 26 MMOL/L (ref 21–32)
CREAT SERPL-MCNC: 0.7 MG/DL (ref 0.9–1.3)
CREATININE URINE: 160.3 MG/DL (ref 39–259)
GFR SERPL CREATININE-BSD FRML MDRD: >60 ML/MIN/{1.73_M2}
GLUCOSE BLD-MCNC: 215 MG/DL (ref 70–99)
HDLC SERPL-MCNC: 35 MG/DL (ref 40–60)
LDL CHOLESTEROL CALCULATED: 88 MG/DL
MICROALBUMIN UR-MCNC: <1.2 MG/DL
MICROALBUMIN/CREAT UR-RTO: NORMAL MG/G (ref 0–30)
POTASSIUM SERPL-SCNC: 4.2 MMOL/L (ref 3.5–5.1)
SODIUM BLD-SCNC: 140 MMOL/L (ref 136–145)
TOTAL PROTEIN: 6.7 G/DL (ref 6.4–8.2)
TRIGL SERPL-MCNC: 78 MG/DL (ref 0–150)
TSH SERPL DL<=0.05 MIU/L-ACNC: 1.03 UIU/ML (ref 0.27–4.2)
VLDLC SERPL CALC-MCNC: 16 MG/DL

## 2022-11-09 LAB
ESTIMATED AVERAGE GLUCOSE: 251.8 MG/DL
HBA1C MFR BLD: 10.4 %

## 2022-11-10 ENCOUNTER — OFFICE VISIT (OUTPATIENT)
Dept: ENDOCRINOLOGY | Age: 32
End: 2022-11-10
Payer: COMMERCIAL

## 2022-11-10 VITALS
BODY MASS INDEX: 28.88 KG/M2 | HEART RATE: 106 BPM | DIASTOLIC BLOOD PRESSURE: 93 MMHG | RESPIRATION RATE: 16 BRPM | SYSTOLIC BLOOD PRESSURE: 126 MMHG | WEIGHT: 195 LBS | HEIGHT: 69 IN

## 2022-11-10 DIAGNOSIS — Z79.4 LONG-TERM INSULIN USE (HCC): ICD-10-CM

## 2022-11-10 DIAGNOSIS — I10 ESSENTIAL HYPERTENSION: ICD-10-CM

## 2022-11-10 DIAGNOSIS — E78.2 MIXED HYPERLIPIDEMIA: ICD-10-CM

## 2022-11-10 DIAGNOSIS — E10.10 TYPE 1 DIABETES MELLITUS WITH KETOACIDOSIS WITHOUT COMA (HCC): Primary | ICD-10-CM

## 2022-11-10 PROCEDURE — 3074F SYST BP LT 130 MM HG: CPT | Performed by: INTERNAL MEDICINE

## 2022-11-10 PROCEDURE — 3046F HEMOGLOBIN A1C LEVEL >9.0%: CPT | Performed by: INTERNAL MEDICINE

## 2022-11-10 PROCEDURE — 3078F DIAST BP <80 MM HG: CPT | Performed by: INTERNAL MEDICINE

## 2022-11-10 PROCEDURE — 99214 OFFICE O/P EST MOD 30 MIN: CPT | Performed by: INTERNAL MEDICINE

## 2022-11-10 RX ORDER — METFORMIN HYDROCHLORIDE 500 MG/1
500 TABLET, EXTENDED RELEASE ORAL
Qty: 90 TABLET | Refills: 1 | Status: SHIPPED | OUTPATIENT
Start: 2022-11-10

## 2022-11-10 ASSESSMENT — ENCOUNTER SYMPTOMS: VISUAL CHANGE: 0

## 2022-11-10 NOTE — PROGRESS NOTES
FMLA form completed signed, scanned into patients chart and faxed to Healthcare Provider Certification [employer] [phone]  Patient notified  Haroon Puga. is a 29 y.o. male  seen for the management of T 1 DM . Pt diagnosed with diabetes at age 17 years and has been wearing insulin pump since diagnosis He has a very strong family hx of T1 Diabetss His mom has T1 DM as well as his younger  brother who was diagnosed at age 6years of age. 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 He has type 1 diabetes mellitus. MedicAlert identification noted. The initial diagnosis of diabetes was made 14 years ago. His disease course has been worsening. Hypoglycemia symptoms include nervousness/anxiousness, speech difficulty, sweats and tremors. Pertinent negatives for diabetes include no foot ulcerations, no polydipsia, no polyphagia, no polyuria, no visual change, no weakness and no weight loss. There are no hypoglycemic complications. Pertinent negatives for hypoglycemia complications include no nocturnal hypoglycemia, no required assistance and no required glucagon injection. Symptoms are worsening. Diabetic complications include peripheral neuropathy. Risk factors for coronary artery disease include diabetes mellitus, dyslipidemia and male sex. Current diabetic treatment includes intensive insulin program. He is compliant with treatment some of the time. His weight is stable. He is following a diabetic diet. When asked about meal planning, he reported none. He has had a previous visit with a dietitian.  He rarely participates in exercise. His breakfast blood glucose is taken between 8-9 am. His breakfast blood glucose range is generally 180-200 mg/dl. His lunch blood glucose range is generally >200 mg/dl. An ACE inhibitor/angiotensin II receptor blocker is not being taken. He does not see a podiatrist.Eye exam is current. He has gained 20 lbs since 2019   Has been eating unhealthy for most of the these months   He denies any unexplained hypoglycemia   -he has not been wearing sensor all the time   Finds issues with food bolus     Weight trend: stable  Prior visit with dietician: yes  Current diet: on average, 3 meals per day  Current exercise: rare    Has there been any hospitalization, surgery or major illness since the last visit? No   Has there been any new school, family or social problems since the last visit? No  Has there been any new family history of members with diabetes, heart disease, stroke, or endocrine related problems since the last visit?   No    Past Medical History:   Diagnosis Date    Anxiety     Brachial plexus injury     Depression     Diabetes mellitus (HCC)     Type 1    Insulin pump in place     Type 1 diabetes mellitus (HCC)     Wrist pain, acute     R wrist       Patient Active Problem List   Diagnosis    Brachial plexus injury, left    Community acquired pneumonia    Other specified counseling    Depression    Diabetes mellitus with ketoacidosis ([address]    Ketoacidosis in patient with type 1 diabetes mellitus ([address]    Encounter for long-term (current) use of insulin ([address]    Hyperglycemia    Myofascial pain    Mental disorder    Type 1 diabetes mellitus ([address]     Past Surgical History:   Procedure Laterality Date    ANKLE FRACTURE SURGERY Left     HERNIA REPAIR      SHOULDER SURGERY       Social History     Socioeconomic History    Marital status: Single     Spouse name: Not on file    Number of children: Not on file    Years of education: Not on file    Highest education level: Not on file   Occupational History    Not on file   Tobacco Use    Smoking status: Never    Smokeless tobacco: Never    Tobacco comments:     former smoker    Vaping Use    Vaping Use: Every day    Start date: 1/1/2015    Substances: Always (3mg/ml)   Substance and Sexual Activity    Alcohol use: Yes     Comment: occ    Drug use: No    Sexual activity: Not on file   Other Topics Concern    Not on file   Social History Narrative    Not on file     Social Determinants of Health     Financial Resource Strain: Not on file   Food Insecurity: Not on file   Transportation Needs: Not on file   Physical Activity: Not on file   Stress: Not on file   Social Connections: Not on file   Intimate Partner Violence: Not on file   Housing Stability: Not on file     Family History   Problem Relation Age of Onset    Diabetes Mother     Thyroid Disease Mother     Anxiety Disorder Mother     Cancer Mother         ovarian cancer    Other Mother         blood clot    Depression Father     Other Sister         GERD    Cancer Maternal Grandmother     Arthritis Maternal Grandmother     Anxiety Disorder Maternal Grandfather     High Blood Pressure Maternal Grandfather     Depression Maternal Grandfather     Anxiety Disorder Sister     Diabetes Sister      Current Outpatient Medications   Medication Sig Dispense Refill    metFORMIN (GLUCOPHAGE-XR) 500 MG extended release tablet Take 1 tablet by mouth daily (with breakfast) 90 tablet 1    insulin lispro (HUMALOG) 100 UNIT/ML injection vial Inject 150 units via pump daily.  90 mL 1    Glucagon (GVOKE PFS) 1 MG/0.2ML SOSY Inject 1 mg into the skin as needed (to be used in case of hypoglycemia) 2 each 3    lisinopril (PRINIVIL;ZESTRIL) 2.5 MG tablet TAKE 1 TABLET BY MOUTH DAILY 90 tablet 1    blood glucose test strips (ACCU-CHEK GUIDE) strip by In Vitro route daily Check blood 4-6 times daily      vitamin D (ERGOCALCIFEROL) 1.25 MG (85844 UT) CAPS capsule TAKE 1 CAPSULE BY MOUTH EVERY WEEK 12 capsule 2 Insulin Syringe-Needle U-100 (B-D INSULIN SYRINGE 1CC/25GX1\") 25G X 1\" 1 ML MISC Use with testosterone shots weekly 4 each 5    cetirizine (ZYRTEC) 10 MG tablet TK 1 T PO QD      ondansetron (ZOFRAN) 4 MG tablet Take 4 mg by mouth every 8 hours as needed for Nausea or Vomiting      acetaminophen (TYLENOL) 325 MG tablet Take 650 mg by mouth every 6 hours as needed for Pain      gabapentin (NEURONTIN) 600 MG tablet Take 600 mg by mouth 3 times daily      methocarbamol (ROBAXIN) 750 MG tablet Take 750 mg by mouth 3 times daily        No current facility-administered medications for this visit. Allergies   Allergen Reactions    Citalopram      Worsening depression     Family Status   Relation Name Status    Mother  (Not Specified)    Father  (Not Specified)    Sister  (Not Specified)    MGM  (Not Specified)    MGF  (Not Specified)    Sister  (Not Specified)       Review of Systems  I have reviewed the review of system questionnaire filled by the patient .   Patient was advised to contact PCP for non endocrine signs and symptoms       OBJECTIVE:  BP (!) 126/93   Pulse (!) 106   Resp 16   Ht 5' 9\" (1.753 m)   Wt 195 lb (88.5 kg)   BMI 28.80 kg/m²    Wt Readings from Last 3 Encounters:   11/10/22 195 lb (88.5 kg)   06/22/22 189 lb (85.7 kg)   02/16/22 180 lb 3.2 oz (81.7 kg)     Constitutional: no acute distress, well appearing, well nourished  Psychiatric: oriented to person, place and time, judgement, insight and normal, recent and remote memory and intact and mood, affect are normal  Skin: skin and subcutaneous tissue is normal without mass,   Head and Face: examination of head and face revealed no abnormalities  Eyes: no lid or conjunctival swelling, no erythema or discharge, pupils are normal,   Ears/Nose: external inspection of ears and nose revealed no abnormalities, hearing is grossly normal  Oropharynx/Mouth/Face: lips, tongue and gums are normal with no lesions, the voice quality was normal  Neck: neck is supple and symmetric, with midline trachea and no masses, thyroid is normal    Pulmonary: no increased work of breathing or signs of respiratory distress, lungs are clear to auscultation  Cardiovascular: normal heart rate and rhythm, normal S1 and S2,   Musculoskeletal: normal gait and station,   Neurological: normal coordination, normal general cortical function      Lab Results   Component Value Date/Time    LABA1C 10.4 11/08/2022 12:52 PM    LABA1C 9.3 06/18/2022 10:16 AM    LABA1C 9.5 02/16/2022 11:17 AM       ASSESSMENT/PLAN:      POORLY CONTROLLED TYPE 1 DIABETES A1C 14 >10 >>9.2>>10.5>> >>8.6>>8.9>>10.1>>7.7>>9.0>>10.1>>9.6>>9.7>>10.2     AIC 9.5--patient has poorly controlled diabetes>>10.4   Start metformin 500 mg er daily   All possible Side effects were discussed in detail with patient in detail and patient was advised to call our office if he  notes any side effects hewas given are written handout detailing side effects from the medication. Patient was also advised that in case of pump failure he would be needing 35 units of basal insulin and keep the same carbohydrate to insulin ratio that he is currently using for the short acting insulin. --- he was advised to start taking meal boluses 10 to 15 minutes before he eats on regular basis. .     Again we had a lengthy discussion about optimal control of diabetes   Hypoglycemia is not a major issue at this time     Hypoglycemia protocol reviewed in detail with patient Patient was advised to carry glucose tablets and also have glucagon emergency kit. Patient was advised that sending of his fingerstick blood glucose logs is crucial in management of his  diabetes. I will adjust the  dose of insulin according to sent data.      Health Maintenance   - [facility]. was advised to have annual dilated eye exam     Last Eye Exam: 2022 mild retinopathy   Last Podiatry Exam: foot care discussed   Lipids: ldl 120 >>107>>103   Microalbumin/Creatinine Ratio: normal April 2019       Microalbuminuria   Started low dose lisinopril, microalbumin to creatinine ratio is now back to normal September 2021  Again discussed in detail   Repeat MA was normal     Spinal arthrosis   He  is following with his PCP     Hyperlipidemia   Last LDL was 120 >>100>>103  Not on statins   Will work on diet   Advised to work on diet and exercise  We will consider statins if above target    VIt D Def level was 13 >>29 >>17>.23 >>11>>51  Advised to  rx from pharmacy   He is on 5000 Iu daily advised to continue     DEPRESSION   He is not on meds any more and feels better now     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 Please notify me in case of any questions about the content of this document, as some errors in transcription may have occurred . Lila Berrios. was counseled regarding symptoms of current diagnosis, course and complications of disease if inadequately treated, side effects of medications, diagnosis, treatment options, and prognosis, risks, benefits, complications, and alternatives of treatment, labs, imaging and other studies and treatment targets and goals. He understands instructions and counseling. These diagnosis were discussed and reviewed with the patient including the advantages of drug therapy. He was counseled at this visit on the following: diabetes complication prevention and foot care. Return in about 3 months (around 2/10/2023).

## 2022-11-28 ENCOUNTER — TELEPHONE (OUTPATIENT)
Dept: ENDOCRINOLOGY | Age: 32
End: 2022-11-28
Payer: COMMERCIAL

## 2022-11-28 DIAGNOSIS — E10.10 TYPE 1 DIABETES MELLITUS WITH KETOACIDOSIS WITHOUT COMA (HCC): Primary | ICD-10-CM

## 2022-11-28 PROCEDURE — 95251 CONT GLUC MNTR ANALYSIS I&R: CPT | Performed by: INTERNAL MEDICINE

## 2022-11-28 NOTE — TELEPHONE ENCOUNTER
Diabetes Continuous Glucose Monitoring Report         Reason for Study:     - improve diabetic control without risk of hypoglycemia       Current Medication regimen:     [employer]onic insulin pump  CGMS Report     CGMS data collection was performed on 11/27/2022  Patient provided information on his  diet, activities and insulin dosing  during this period. Data was available for 7+ days     Sensor Data Report:   - 12 AM to 6 AM: Overnight blood glucose pattern shows stable glycemia overnight  - 6   AM to 10 AM:  Post breakfast hyperglycemia is noted  --10AM to 5 PM : No hypoglycemia observed during this time. - 5   PM to 8 PM: Post meal hyperglycemia is noted       Average reading 284 mg/dL     Standard Dev 74 mg/dL   % of time <70 mg/dL 0   % of time >180  mg/dL 61%   % of time within range 39%  Number of hypoglycemia episodes noted: 0     Impression:   CGMS shows stable glycemia when in auto mode but significant postprandial hyperglycemia     Recommendation:      Patient was advised to make the following changes in his diabetic regimen  Needs to take his premeal boluses 10 minutes ahead of time. Stay in auto mode for longer periods of time.

## 2022-12-12 DIAGNOSIS — Z79.4 LONG-TERM INSULIN USE (HCC): ICD-10-CM

## 2022-12-12 DIAGNOSIS — E55.9 VITAMIN D DEFICIENCY: ICD-10-CM

## 2022-12-12 RX ORDER — ERGOCALCIFEROL 1.25 MG/1
CAPSULE ORAL
Qty: 12 CAPSULE | Refills: 2 | OUTPATIENT
Start: 2022-12-12

## 2023-01-18 DIAGNOSIS — Z79.4 LONG-TERM INSULIN USE (HCC): ICD-10-CM

## 2023-01-19 RX ORDER — LISINOPRIL 2.5 MG/1
2.5 TABLET ORAL DAILY
Qty: 90 TABLET | Refills: 1 | Status: SHIPPED | OUTPATIENT
Start: 2023-01-19

## 2023-01-30 ENCOUNTER — TELEPHONE (OUTPATIENT)
Dept: ENDOCRINOLOGY | Age: 33
End: 2023-01-30

## 2023-02-06 ENCOUNTER — TELEPHONE (OUTPATIENT)
Dept: ENDOCRINOLOGY | Age: 33
End: 2023-02-06

## 2023-02-06 NOTE — TELEPHONE ENCOUNTER
2 faxes from [place]    -approval for Guardian Sensor 3 from 1/30/23-1/30/24    -approval Guardian Link 3 transmitter from 1/30/23-1/30/24

## 2023-02-16 ENCOUNTER — TELEPHONE (OUTPATIENT)
Dept: ENDOCRINOLOGY | Age: 33
End: 2023-02-16

## 2023-05-13 DIAGNOSIS — E10.10 TYPE 1 DIABETES MELLITUS WITH KETOACIDOSIS WITHOUT COMA (HCC): ICD-10-CM

## 2023-05-13 DIAGNOSIS — E78.2 MIXED HYPERLIPIDEMIA: ICD-10-CM

## 2023-05-13 LAB
ALBUMIN SERPL-MCNC: 4.6 G/DL (ref 3.4–5)
ALBUMIN/GLOB SERPL: 1.9 {RATIO} (ref 1.1–2.2)
ALP SERPL-CCNC: 94 U/L (ref 40–129)
ALT SERPL-CCNC: 29 U/L (ref 10–40)
ANION GAP SERPL CALCULATED.3IONS-SCNC: 9 MMOL/L (ref 3–16)
AST SERPL-CCNC: 26 U/L (ref 15–37)
BILIRUB SERPL-MCNC: 1.1 MG/DL (ref 0–1)
BUN SERPL-MCNC: 13 MG/DL (ref 7–20)
CALCIUM SERPL-MCNC: 9.6 MG/DL (ref 8.3–10.6)
CHLORIDE SERPL-SCNC: 101 MMOL/L (ref 99–110)
CHOLEST SERPL-MCNC: 147 MG/DL (ref 0–199)
CO2 SERPL-SCNC: 27 MMOL/L (ref 21–32)
CREAT SERPL-MCNC: 0.8 MG/DL (ref 0.9–1.3)
CREAT UR-MCNC: 158.5 MG/DL (ref 39–259)
GFR SERPLBLD CREATININE-BSD FMLA CKD-EPI: >60 ML/MIN/{1.73_M2}
GLUCOSE SERPL-MCNC: 183 MG/DL (ref 70–99)
HDLC SERPL-MCNC: 42 MG/DL (ref 40–60)
LDLC SERPL CALC-MCNC: 89 MG/DL
MICROALBUMIN UR DL<=1MG/L-MCNC: 5.2 MG/DL
MICROALBUMIN/CREAT UR: 32.8 MG/G (ref 0–30)
POTASSIUM SERPL-SCNC: 4.6 MMOL/L (ref 3.5–5.1)
PROT SERPL-MCNC: 7 G/DL (ref 6.4–8.2)
SODIUM SERPL-SCNC: 137 MMOL/L (ref 136–145)
TRIGL SERPL-MCNC: 82 MG/DL (ref 0–150)
TSH SERPL DL<=0.005 MIU/L-ACNC: 1.04 UIU/ML (ref 0.27–4.2)
VLDLC SERPL CALC-MCNC: 16 MG/DL

## 2023-05-14 LAB
EST. AVERAGE GLUCOSE BLD GHB EST-MCNC: 185.8 MG/DL
HBA1C MFR BLD: 8.1 %

## 2023-05-18 ENCOUNTER — OFFICE VISIT (OUTPATIENT)
Dept: ENDOCRINOLOGY | Age: 33
End: 2023-05-18

## 2023-05-18 ENCOUNTER — TELEPHONE (OUTPATIENT)
Dept: ENDOCRINOLOGY | Age: 33
End: 2023-05-18

## 2023-05-18 VITALS
SYSTOLIC BLOOD PRESSURE: 136 MMHG | BODY MASS INDEX: 29.47 KG/M2 | DIASTOLIC BLOOD PRESSURE: 87 MMHG | WEIGHT: 199 LBS | TEMPERATURE: 98 F | HEART RATE: 99 BPM | HEIGHT: 69 IN | RESPIRATION RATE: 14 BRPM

## 2023-05-18 DIAGNOSIS — E10.10 TYPE 1 DIABETES MELLITUS WITH KETOACIDOSIS WITHOUT COMA (HCC): Primary | ICD-10-CM

## 2023-05-18 DIAGNOSIS — R80.9 DIABETES MELLITUS WITH MICROALBUMINURIA (HCC): ICD-10-CM

## 2023-05-18 DIAGNOSIS — E78.2 MIXED HYPERLIPIDEMIA: ICD-10-CM

## 2023-05-18 DIAGNOSIS — E11.29 DIABETES MELLITUS WITH MICROALBUMINURIA (HCC): ICD-10-CM

## 2023-05-18 DIAGNOSIS — S14.3XXS INJURY OF LEFT BRACHIAL PLEXUS, SEQUELA: ICD-10-CM

## 2023-05-18 RX ORDER — GLUCAGON 3 MG/1
POWDER NASAL
Qty: 2 EACH | Refills: 3 | Status: SHIPPED | OUTPATIENT
Start: 2023-05-18

## 2023-05-18 ASSESSMENT — ENCOUNTER SYMPTOMS: VISUAL CHANGE: 0

## 2023-05-18 NOTE — PROGRESS NOTES
Dao Slaughter. is a 34 y.o. male  seen for the management of T 1 DM . Pt diagnosed with diabetes at age 17 years and has been wearing insulin pump since diagnosis He has a very strong family hx of T1 Diabetss His mom has T1 DM as well as his younger  brother who was diagnosed at age 6years of age. 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 He has type 1 diabetes mellitus. MedicAlert identification noted. The initial diagnosis of diabetes was made 14 years ago. His disease course has been worsening. Hypoglycemia symptoms include nervousness/anxiousness, speech difficulty, sweats and tremors. Pertinent negatives for diabetes include no foot ulcerations, no polydipsia, no polyphagia, no polyuria, no visual change, no weakness and no weight loss. There are no hypoglycemic complications. Pertinent negatives for hypoglycemia complications include no nocturnal hypoglycemia, no required assistance and no required glucagon injection. Symptoms are worsening. Diabetic complications include peripheral neuropathy. Risk factors for coronary artery disease include diabetes mellitus, dyslipidemia and male sex. Current diabetic treatment includes intensive insulin program. He is compliant with treatment some of the time. His weight is stable. He is following a diabetic diet. When asked about meal planning, he reported none. He has had a previous visit with a dietitian.  He

## 2023-06-28 ENCOUNTER — TELEPHONE (OUTPATIENT)
Dept: ENDOCRINOLOGY | Age: 33
End: 2023-06-28

## 2023-07-25 ENCOUNTER — TELEPHONE (OUTPATIENT)
Dept: ENDOCRINOLOGY | Age: 33
End: 2023-07-25

## 2023-09-27 DIAGNOSIS — Z79.4 LONG-TERM INSULIN USE (HCC): ICD-10-CM

## 2023-09-27 RX ORDER — LISINOPRIL 2.5 MG/1
2.5 TABLET ORAL DAILY
Qty: 90 TABLET | Refills: 1 | Status: SHIPPED | OUTPATIENT
Start: 2023-09-27

## 2023-10-02 DIAGNOSIS — E10.10 TYPE 1 DIABETES MELLITUS WITH KETOACIDOSIS WITHOUT COMA (HCC): ICD-10-CM

## 2023-10-02 RX ORDER — INSULIN LISPRO 100 [IU]/ML
INJECTION, SOLUTION INTRAVENOUS; SUBCUTANEOUS
Qty: 90 ML | Refills: 5 | Status: SHIPPED | OUTPATIENT
Start: 2023-10-02

## 2023-10-17 ENCOUNTER — TELEPHONE (OUTPATIENT)
Dept: ENDOCRINOLOGY | Age: 33
End: 2023-10-17

## 2023-10-17 DIAGNOSIS — Z79.4 LONG-TERM INSULIN USE (HCC): ICD-10-CM

## 2023-10-17 RX ORDER — LISINOPRIL 2.5 MG/1
2.5 TABLET ORAL DAILY
Qty: 90 TABLET | Refills: 1 | Status: SHIPPED | OUTPATIENT
Start: 2023-10-17

## 2023-10-31 DIAGNOSIS — E10.10 TYPE 1 DIABETES MELLITUS WITH KETOACIDOSIS WITHOUT COMA (HCC): ICD-10-CM

## 2023-10-31 LAB
ALBUMIN SERPL-MCNC: 4.2 G/DL (ref 3.4–5)
ALBUMIN/GLOB SERPL: 1.7 {RATIO} (ref 1.1–2.2)
ALP SERPL-CCNC: 100 U/L (ref 40–129)
ALT SERPL-CCNC: 57 U/L (ref 10–40)
ANION GAP SERPL CALCULATED.3IONS-SCNC: 12 MMOL/L (ref 3–16)
AST SERPL-CCNC: 32 U/L (ref 15–37)
BILIRUB SERPL-MCNC: 0.7 MG/DL (ref 0–1)
BUN SERPL-MCNC: 13 MG/DL (ref 7–20)
CALCIUM SERPL-MCNC: 9.4 MG/DL (ref 8.3–10.6)
CHLORIDE SERPL-SCNC: 103 MMOL/L (ref 99–110)
CHOLEST SERPL-MCNC: 156 MG/DL (ref 0–199)
CO2 SERPL-SCNC: 26 MMOL/L (ref 21–32)
CREAT SERPL-MCNC: 0.8 MG/DL (ref 0.9–1.3)
GFR SERPLBLD CREATININE-BSD FMLA CKD-EPI: >60 ML/MIN/{1.73_M2}
GLUCOSE SERPL-MCNC: 186 MG/DL (ref 70–99)
HDLC SERPL-MCNC: 39 MG/DL (ref 40–60)
LDLC SERPL CALC-MCNC: 100 MG/DL
POTASSIUM SERPL-SCNC: 4.4 MMOL/L (ref 3.5–5.1)
PROT SERPL-MCNC: 6.7 G/DL (ref 6.4–8.2)
SODIUM SERPL-SCNC: 141 MMOL/L (ref 136–145)
TRIGL SERPL-MCNC: 84 MG/DL (ref 0–150)
TSH SERPL DL<=0.005 MIU/L-ACNC: 1.08 UIU/ML (ref 0.27–4.2)
VLDLC SERPL CALC-MCNC: 17 MG/DL

## 2023-11-01 LAB
EST. AVERAGE GLUCOSE BLD GHB EST-MCNC: 171.4 MG/DL
HBA1C MFR BLD: 7.6 %

## 2023-11-02 ENCOUNTER — OFFICE VISIT (OUTPATIENT)
Dept: ENDOCRINOLOGY | Age: 33
End: 2023-11-02

## 2023-11-02 VITALS
SYSTOLIC BLOOD PRESSURE: 140 MMHG | WEIGHT: 211 LBS | BODY MASS INDEX: 31.25 KG/M2 | RESPIRATION RATE: 16 BRPM | HEIGHT: 69 IN | DIASTOLIC BLOOD PRESSURE: 92 MMHG | HEART RATE: 74 BPM

## 2023-11-02 DIAGNOSIS — Z79.4 LONG-TERM INSULIN USE (HCC): ICD-10-CM

## 2023-11-02 DIAGNOSIS — E55.9 VITAMIN D DEFICIENCY: ICD-10-CM

## 2023-11-02 DIAGNOSIS — E10.10 TYPE 1 DIABETES MELLITUS WITH KETOACIDOSIS WITHOUT COMA (HCC): ICD-10-CM

## 2023-11-02 DIAGNOSIS — E10.649 TYPE 1 DIABETES MELLITUS WITH HYPOGLYCEMIA AND WITHOUT COMA (HCC): Primary | ICD-10-CM

## 2023-11-02 RX ORDER — INSULIN LISPRO 100 [IU]/ML
INJECTION, SOLUTION INTRAVENOUS; SUBCUTANEOUS
Qty: 90 ML | Refills: 5 | Status: SHIPPED | OUTPATIENT
Start: 2023-11-02

## 2023-11-02 RX ORDER — TIRZEPATIDE 2.5 MG/.5ML
2.5 INJECTION, SOLUTION SUBCUTANEOUS WEEKLY
Qty: 4 EACH | Refills: 4 | Status: SHIPPED | OUTPATIENT
Start: 2023-11-02

## 2023-11-02 ASSESSMENT — ENCOUNTER SYMPTOMS: VISUAL CHANGE: 0

## 2023-11-02 NOTE — PROGRESS NOTES
>>8.6>>8.9>>10.1>>7.7>>9.0>>10.1>>9.6>>9.7>>10.2     AIC 9.5--patient has poorly controlled diabetes>>10.4 >>8.1>>7.6    I reviewed patient's  continuous glucose sensor data in detail and made appropriate changes in patient's diabetic regimen. Take meal boluses 10 min before each meal   On 780 insulin pump   He changed his cannula from 9 mm to 6 mm noted significant improvement and now since switched from [id number] insulin pump A1c dropped down significantly. We discussed modification of meal boluses and avoiding high carb meals  Mom requested a trial of GLP-1 agonist  I have sent a prescription for Mounjaro we did discuss it that this is not an FDA approved use and a type I diabetic but most likely semaglutide for weight loss can be approved since patient is an obese category. Daily insulin requirements are around 133 units    Hypoglycemia protocol reviewed in detail with patient Patient was advised to carry glucose tablets and also have glucagon emergency kit. Health Maintenance   - [facility]. was advised to have annual dilated eye exam     Last Eye Exam: 2022 mild retinopathy   Last Podiatry Exam: foot care discussed   Lipids: ldl 120 >>107>>103   Microalbumin/Creatinine Ratio: elevated       Microalbuminuria   Started low dose lisinopril, microalbumin to creatinine ratio is now back to normal September 2021  Again discussed in detail   Repeat MA was normal     Obesity  I had a lengthy discussion with the patient regarding caloric restriction as well as stepping up exercise. We discussed caloric goal in detail. Also discussed with patient the utility of mobile phone apps like \" my fitness Pal \" or \"Lose It \". Patient verbalized understanding and agrees to work on this aspect. he has no stigma of  Cushing disease or untreated thyroid disorder.        Spinal arthrosis   He is following with his PCP     Hyperlipidemia   Last LDL was 120 >>100>>103>>89>>100  Not on statins   Will work on diet

## 2023-11-03 ENCOUNTER — TELEPHONE (OUTPATIENT)
Dept: ENDOCRINOLOGY | Age: 33
End: 2023-11-03

## 2023-11-03 NOTE — TELEPHONE ENCOUNTER
Fax from [phone] [facility] to approve coverage for [facility].  This request is denied because the medication requested is not currently FDA approved for weight loss

## 2023-11-06 NOTE — TELEPHONE ENCOUNTER
JUAN ANTONIO Atkins Patient made aware Cappuccini is not covered because patient is not a Type 2 diabetic. The letter also states the policy also does not cover weight loss medication so he would not be a candidate for the wegovy as well. Patient is going to work on his diet.  He has some Metformin left over from the last time it was prescribed and he may try that as well, but he wants to work on the diet first.

## 2024-01-31 ENCOUNTER — TELEPHONE (OUTPATIENT)
Dept: ENDOCRINOLOGY | Age: 34
End: 2024-01-31

## 2024-02-12 ENCOUNTER — TELEPHONE (OUTPATIENT)
Dept: ENDOCRINOLOGY | Age: 34
End: 2024-02-12

## 2024-02-22 DIAGNOSIS — Z79.4 LONG-TERM INSULIN USE (HCC): ICD-10-CM

## 2024-02-22 DIAGNOSIS — E10.649 TYPE 1 DIABETES MELLITUS WITH HYPOGLYCEMIA AND WITHOUT COMA (HCC): ICD-10-CM

## 2024-02-22 LAB
ALBUMIN SERPL-MCNC: 4.3 G/DL (ref 3.4–5)
ALBUMIN/GLOB SERPL: 1.7 {RATIO} (ref 1.1–2.2)
ALP SERPL-CCNC: 108 U/L (ref 40–129)
ALT SERPL-CCNC: 45 U/L (ref 10–40)
ANION GAP SERPL CALCULATED.3IONS-SCNC: 13 MMOL/L (ref 3–16)
AST SERPL-CCNC: 52 U/L (ref 15–37)
BILIRUB SERPL-MCNC: 0.6 MG/DL (ref 0–1)
BUN SERPL-MCNC: 15 MG/DL (ref 7–20)
CALCIUM SERPL-MCNC: 8.9 MG/DL (ref 8.3–10.6)
CHLORIDE SERPL-SCNC: 102 MMOL/L (ref 99–110)
CHOLEST SERPL-MCNC: 161 MG/DL (ref 0–199)
CO2 SERPL-SCNC: 23 MMOL/L (ref 21–32)
CREAT SERPL-MCNC: 0.9 MG/DL (ref 0.9–1.3)
CREAT UR-MCNC: 301.8 MG/DL (ref 39–259)
GFR SERPLBLD CREATININE-BSD FMLA CKD-EPI: >60 ML/MIN/{1.73_M2}
GLUCOSE SERPL-MCNC: 214 MG/DL (ref 70–99)
HDLC SERPL-MCNC: 34 MG/DL (ref 40–60)
LDLC SERPL CALC-MCNC: 93 MG/DL
MICROALBUMIN UR DL<=1MG/L-MCNC: 4.1 MG/DL
MICROALBUMIN/CREAT UR: 13.6 MG/G (ref 0–30)
POTASSIUM SERPL-SCNC: 4.4 MMOL/L (ref 3.5–5.1)
PROT SERPL-MCNC: 6.8 G/DL (ref 6.4–8.2)
SODIUM SERPL-SCNC: 138 MMOL/L (ref 136–145)
TRIGL SERPL-MCNC: 172 MG/DL (ref 0–150)
TSH SERPL DL<=0.005 MIU/L-ACNC: 0.74 UIU/ML (ref 0.27–4.2)
VLDLC SERPL CALC-MCNC: 34 MG/DL

## 2024-02-23 ENCOUNTER — TELEPHONE (OUTPATIENT)
Dept: ENDOCRINOLOGY | Age: 34
End: 2024-02-23

## 2024-02-23 LAB
EST. AVERAGE GLUCOSE BLD GHB EST-MCNC: 151.3 MG/DL
HBA1C MFR BLD: 6.9 %

## 2024-02-28 ENCOUNTER — TELEPHONE (OUTPATIENT)
Dept: ENDOCRINOLOGY | Age: 34
End: 2024-02-28

## 2024-02-28 ENCOUNTER — OFFICE VISIT (OUTPATIENT)
Dept: ENDOCRINOLOGY | Age: 34
End: 2024-02-28
Payer: COMMERCIAL

## 2024-02-28 VITALS
TEMPERATURE: 98 F | WEIGHT: 197 LBS | HEART RATE: 74 BPM | DIASTOLIC BLOOD PRESSURE: 83 MMHG | RESPIRATION RATE: 14 BRPM | HEIGHT: 69 IN | SYSTOLIC BLOOD PRESSURE: 136 MMHG | BODY MASS INDEX: 29.18 KG/M2

## 2024-02-28 DIAGNOSIS — S14.3XXA INJURY OF LEFT BRACHIAL PLEXUS, INITIAL ENCOUNTER: ICD-10-CM

## 2024-02-28 DIAGNOSIS — E78.2 MIXED HYPERLIPIDEMIA: ICD-10-CM

## 2024-02-28 DIAGNOSIS — E10.10 TYPE 1 DIABETES MELLITUS WITH KETOACIDOSIS WITHOUT COMA (HCC): Primary | ICD-10-CM

## 2024-02-28 PROCEDURE — 3044F HG A1C LEVEL LT 7.0%: CPT | Performed by: INTERNAL MEDICINE

## 2024-02-28 PROCEDURE — 99214 OFFICE O/P EST MOD 30 MIN: CPT | Performed by: INTERNAL MEDICINE

## 2024-02-28 PROCEDURE — 95251 CONT GLUC MNTR ANALYSIS I&R: CPT | Performed by: INTERNAL MEDICINE

## 2024-02-28 RX ORDER — INSULIN LISPRO 100 [IU]/ML
INJECTION, SOLUTION INTRAVENOUS; SUBCUTANEOUS
Qty: 90 ML | Refills: 2 | Status: SHIPPED | OUTPATIENT
Start: 2024-02-28

## 2024-02-28 RX ORDER — INSULIN LISPRO 100 [IU]/ML
INJECTION, SOLUTION INTRAVENOUS; SUBCUTANEOUS
Qty: 90 ML | Refills: 1 | Status: SHIPPED | OUTPATIENT
Start: 2024-02-28

## 2024-02-28 ASSESSMENT — ENCOUNTER SYMPTOMS: VISUAL CHANGE: 0

## 2024-02-28 NOTE — PROGRESS NOTES
rarely participates in exercise. His breakfast blood glucose is taken between 8-9 am. His breakfast blood glucose range is generally 180-200 mg/dl. His lunch blood glucose range is generally >200 mg/dl. An ACE inhibitor/angiotensin II receptor blocker is not being taken. He does not see a podiatrist.Eye exam is current.       He has lost 14 lbs since last visit   Had Covid in Nov 2023           Past Medical History:   Diagnosis Date    Anxiety     Brachial plexus injury     Depression     Diabetes mellitus ([facility])     Type 1    Insulin pump in place     Type 1 diabetes mellitus ([facility])     Wrist pain, acute     R wrist       Patient Active Problem List   Diagnosis    Brachial plexus injury, left    Community acquired pneumonia    Other specified counseling    Depression    Diabetes mellitus with microalbuminuria ([facility])    Ketoacidosis in patient with type 1 diabetes mellitus ([facility])    Encounter for long-term (current) use of insulin ([facility])    Hyperglycemia    Myofascial pain    Mental disorder    Type 1 diabetes mellitus ([facility])    Mixed hyperlipidemia     Past Surgical History:   Procedure Laterality Date    ANKLE FRACTURE SURGERY Left     HERNIA REPAIR      SHOULDER SURGERY       Social History     Socioeconomic History    Marital status: Single     Spouse name: Not on file    Number of children: Not on file    Years of education: Not on file    Highest education level: Not on file   Occupational History    Not on file   Tobacco Use    Smoking status: Never    Smokeless tobacco: Never    Tobacco comments:     former smoker    Vaping Use    Vaping Use: Every day    Start date: 1/1/2015    Substances: Always (3mg/ml)   Substance and Sexual Activity    Alcohol use: Yes     Comment: occ    Drug use: No    Sexual activity: Not on file   Other Topics Concern    Not on file   Social History Narrative    Not on file     Social Determinants of Health     Financial Resource Strain: Not on file   Food Insecurity: Not on file

## 2024-02-28 NOTE — TELEPHONE ENCOUNTER
Pt calling and states he was just seen today and he had got a letter in mail recently  stating his insurance will not cover Humalog but will cover insulin lispro. Instructed to pt that comes up at the same thing in our computer insulin lispro (humalog) so maybe it needs to be wrote for generic? Or Novolog? Pt states he canceled his refill request w/ his pharmacy so will have to send new rx    # [phone]

## 2024-02-28 NOTE — PATIENT INSTRUCTIONS
having problems. It's also a good idea to know your test results and keep a list of the medicines you take.  How can you care for yourself at home?  Learn your signs of low blood sugar. They are different for everyone. Some common early signs include:  Nausea.  Hunger.  Feeling nervous, irritable, or shaky.  Cold, clammy skin.  Sweating (when you're not exercising).  Use the \"rule of 15\" to treat low blood sugar. This includes eating 15 grams of carbohydrate from a quick-sugar food, such as 3 or 4 glucose tablets or ½ cup of juice. Wait 15 minutes and check your blood sugar. If it is still below 70 mg/dL, eat another 15 grams of carbohydrate. Repeat this every 15 minutes until your blood sugar is in a safe target range.  Once your blood sugar is in a safe range, eat a snack or meal to prevent recurrent low blood sugar.  Make sure family, friends, and coworkers know the symptoms of low blood sugar and know how to get your sugar level up.  If you were prescribed glucagon, always have it with you. Make sure friends and family know how to use it.  When should you call for help?   Call 911 anytime you think you may need emergency care. For example, call if:    You passed out (lost consciousness).     You are confused or cannot think clearly.     Your blood sugar is very high or very low.   Watch closely for changes in your health, and be sure to contact your doctor if:    Your blood sugar stays outside the level your doctor set for you.     You have any problems.   Where can you learn more?  Go to [email or web address].net/patientEd and enter R955 to learn more about \"Hypoglycemia: Care Instructions.\"  Current as of: February 28, 2023               Content Version: 13.9  © 4987-6338 [employer].   Care instructions adapted under license by [employer]. If you have questions about a medical condition or this instruction, always ask your healthcare professional. [employer] disclaims any

## 2024-04-24 NOTE — TELEPHONE ENCOUNTER
Fax from [employer]    90 day rx refill request for Lisinopril 2.5mg    LOV   5-18-23  FOV    11-2-23
Rx sent.
fair balance

## 2024-05-27 DIAGNOSIS — Z79.4 LONG-TERM INSULIN USE (HCC): ICD-10-CM

## 2024-05-28 RX ORDER — LISINOPRIL 2.5 MG/1
2.5 TABLET ORAL DAILY
Qty: 90 TABLET | Refills: 3 | Status: SHIPPED | OUTPATIENT
Start: 2024-05-28

## 2024-07-23 ENCOUNTER — OFFICE VISIT (OUTPATIENT)
Dept: ENDOCRINOLOGY | Age: 34
End: 2024-07-23
Payer: COMMERCIAL

## 2024-07-23 VITALS
SYSTOLIC BLOOD PRESSURE: 131 MMHG | HEART RATE: 71 BPM | TEMPERATURE: 98 F | RESPIRATION RATE: 14 BRPM | DIASTOLIC BLOOD PRESSURE: 88 MMHG | HEIGHT: 69 IN | BODY MASS INDEX: 29.33 KG/M2 | WEIGHT: 198 LBS

## 2024-07-23 DIAGNOSIS — E78.2 MIXED HYPERLIPIDEMIA: ICD-10-CM

## 2024-07-23 DIAGNOSIS — E10.10 TYPE 1 DIABETES MELLITUS WITH KETOACIDOSIS WITHOUT COMA (HCC): Primary | ICD-10-CM

## 2024-07-23 DIAGNOSIS — S14.3XXS INJURY OF LEFT BRACHIAL PLEXUS, SEQUELA: ICD-10-CM

## 2024-07-23 DIAGNOSIS — F32.0 CURRENT MILD EPISODE OF MAJOR DEPRESSIVE DISORDER WITHOUT PRIOR EPISODE (HCC): ICD-10-CM

## 2024-07-23 LAB — HBA1C MFR BLD: 7.5 %

## 2024-07-23 PROCEDURE — 99214 OFFICE O/P EST MOD 30 MIN: CPT | Performed by: INTERNAL MEDICINE

## 2024-07-23 PROCEDURE — 83036 HEMOGLOBIN GLYCOSYLATED A1C: CPT | Performed by: INTERNAL MEDICINE

## 2024-07-23 PROCEDURE — 3051F HG A1C>EQUAL 7.0%<8.0%: CPT | Performed by: INTERNAL MEDICINE

## 2024-07-23 PROCEDURE — 95251 CONT GLUC MNTR ANALYSIS I&R: CPT | Performed by: INTERNAL MEDICINE

## 2024-07-23 ASSESSMENT — ENCOUNTER SYMPTOMS: VISUAL CHANGE: 0

## 2024-07-23 NOTE — PROGRESS NOTES
made appropriate changes in patient's diabetic regimen.  Take meal boluses 10 min before each meal   On 780 insulin pump A1c improved significantly once he was switched to the 780 system.  We discussed modification of meal boluses and avoiding high carb meals  GLP-1 agonist not covered by insurance.    Hypoglycemia protocol reviewed in detail with patient Patient was advised to carry glucose tablets and also have glucagon emergency kit.    Health Maintenance   - Aleln Wolke Sharon was advised to have annual dilated eye exam     Last Eye Exam: 2024 mild non prolif retinopathy s/p laser treatment in July 2023   Last Podiatry Exam: foot care discussed   Lipids: ldl 120 >>107>>103   Microalbumin/Creatinine Ratio: elevated       Microalbuminuria   Started low dose lisinopril, microalbumin to creatinine ratio is now back to normal September 2021  Again discussed in detail   Repeat MA was normal     Obesity  I had a lengthy discussion with the patient regarding caloric restriction as well as stepping up exercise.  We discussed caloric goal in detail.  Also discussed with patient the utility of mobile phone apps like \" my fitness Pal \" or \"Lose It \".   Patient verbalized understanding and agrees to work on this aspect.  he has no stigma of  Cushing disease or untreated thyroid disorder.   He has lost 14 lbs since Nov 2023 and weighs 198 in July 2024 weight has been stable  Has not been exercising so he will incorporate exercise and work on the diet and will try to lose weight before his next visit with me.    Spinal arthrosis   He is following with his PCP     Hyperlipidemia   Last LDL was 120 >>100>>103>>89>>100  TGS were elevated   Not on statins   Will work on diet   Advised to work on diet and exercise  We will consider statins if above target    VIt D Def level was 13 >>29 >>17>.23 >>11>>51  Advised to  rx from pharmacy   He is on 5000 Iu daily advised to continue     DEPRESSION   He is not on meds any more and

## 2024-07-30 ENCOUNTER — TELEPHONE (OUTPATIENT)
Dept: ENDOCRINOLOGY | Age: 34
End: 2024-07-30

## 2024-07-31 ENCOUNTER — TELEPHONE (OUTPATIENT)
Dept: ENDOCRINOLOGY | Age: 34
End: 2024-07-31

## 2024-07-31 NOTE — TELEPHONE ENCOUNTER
Jojo from [employer] called asking/confirming the refill that was sent over for the diabetes and pump supply. Can you please advise on the status of the refill? Jojo [phone] Option 2

## 2024-11-07 DIAGNOSIS — E10.10 TYPE 1 DIABETES MELLITUS WITH KETOACIDOSIS WITHOUT COMA (HCC): ICD-10-CM

## 2024-11-07 DIAGNOSIS — F32.0 CURRENT MILD EPISODE OF MAJOR DEPRESSIVE DISORDER WITHOUT PRIOR EPISODE (HCC): ICD-10-CM

## 2024-11-07 DIAGNOSIS — E78.2 MIXED HYPERLIPIDEMIA: ICD-10-CM

## 2024-11-07 DIAGNOSIS — S14.3XXS INJURY OF LEFT BRACHIAL PLEXUS, SEQUELA: ICD-10-CM

## 2024-11-08 LAB
ALBUMIN SERPL-MCNC: 4.9 G/DL (ref 3.4–5)
ALBUMIN/GLOB SERPL: 1.9 {RATIO} (ref 1.1–2.2)
ALP SERPL-CCNC: 96 U/L (ref 40–129)
ALT SERPL-CCNC: 33 U/L (ref 10–40)
ANION GAP SERPL CALCULATED.3IONS-SCNC: 14 MMOL/L (ref 3–16)
AST SERPL-CCNC: 27 U/L (ref 15–37)
BILIRUB SERPL-MCNC: 2.1 MG/DL (ref 0–1)
BUN SERPL-MCNC: 11 MG/DL (ref 7–20)
CALCIUM SERPL-MCNC: 9.7 MG/DL (ref 8.3–10.6)
CHLORIDE SERPL-SCNC: 98 MMOL/L (ref 99–110)
CHOLEST SERPL-MCNC: 211 MG/DL (ref 0–199)
CO2 SERPL-SCNC: 25 MMOL/L (ref 21–32)
CREAT SERPL-MCNC: 0.9 MG/DL (ref 0.9–1.3)
CREAT UR-MCNC: 296 MG/DL (ref 39–259)
EST. AVERAGE GLUCOSE BLD GHB EST-MCNC: 165.7 MG/DL
GFR SERPLBLD CREATININE-BSD FMLA CKD-EPI: >90 ML/MIN/{1.73_M2}
GLUCOSE SERPL-MCNC: 217 MG/DL (ref 70–99)
HBA1C MFR BLD: 7.4 %
HDLC SERPL-MCNC: 41 MG/DL (ref 40–60)
LDLC SERPL CALC-MCNC: 153 MG/DL
MICROALBUMIN UR DL<=1MG/L-MCNC: <1.2 MG/DL
MICROALBUMIN/CREAT UR: ABNORMAL MG/G (ref 0–30)
POTASSIUM SERPL-SCNC: 4.7 MMOL/L (ref 3.5–5.1)
PROT SERPL-MCNC: 7.5 G/DL (ref 6.4–8.2)
SODIUM SERPL-SCNC: 137 MMOL/L (ref 136–145)
TRIGL SERPL-MCNC: 84 MG/DL (ref 0–150)
TSH SERPL DL<=0.005 MIU/L-ACNC: 0.86 UIU/ML (ref 0.27–4.2)
VLDLC SERPL CALC-MCNC: 17 MG/DL

## 2025-01-09 DIAGNOSIS — E10.10 TYPE 1 DIABETES MELLITUS WITH KETOACIDOSIS WITHOUT COMA (HCC): Primary | ICD-10-CM

## 2025-01-09 RX ORDER — INSULIN LISPRO 100 [IU]/ML
INJECTION, SOLUTION INTRAVENOUS; SUBCUTANEOUS
Qty: 90 ML | Refills: 5 | Status: SHIPPED | OUTPATIENT
Start: 2025-01-09

## 2025-01-09 NOTE — TELEPHONE ENCOUNTER
Medication:   Requested Prescriptions     Pending Prescriptions Disp Refills    HUMALOG 100 UNIT/ML SOLN injection vial [Pharmacy Med Name: HUMALOG VIAL 10ML 100U/ML] 90 mL 5     Sig: INJECT 150 UNITS DAILY VIA PUMP         Last appt: 7/23/2024   Next appt: Visit date not found    Last Labs DM:   Lab Results   Component Value Date/Time    LABA1C 7.4 11/07/2024 11:45 AM     Last Lipid:   Lab Results   Component Value Date/Time    CHOL 211 11/07/2024 11:45 AM    TRIG 84 11/07/2024 11:45 AM    HDL 41 11/07/2024 11:45 AM     Last PSA: No results found for: \"PSA\"  Last Thyroid:   Lab Results   Component Value Date/Time    TSH 0.86 11/07/2024 11:45 AM    TSH 1.210 04/25/2019 04:30 PM

## 2025-02-21 ENCOUNTER — TELEPHONE (OUTPATIENT)
Dept: ENDOCRINOLOGY | Age: 35
End: 2025-02-21

## 2025-02-28 ENCOUNTER — TELEPHONE (OUTPATIENT)
Dept: ENDOCRINOLOGY | Age: 35
End: 2025-02-28

## 2025-02-28 NOTE — TELEPHONE ENCOUNTER
Forms and clinicals faxed.     Spoke to patient about a follow up. He states he's waiting to find out if his insurance will be in network with [facility] and won't know until the April 1st deadline if they've come to an agreement. If [place] remains in network he will call to schedule a follow up. If not he will have to find another endocrinologist.     Patient made aware that his pumps supply delivery may be delayed based on date of last office visit (7 months ago). I offered to fit him in for a visit if this becomes an issue. Patient expressed understanding.

## 2025-03-04 ENCOUNTER — TELEPHONE (OUTPATIENT)
Dept: ENDOCRINOLOGY | Age: 35
End: 2025-03-04

## 2025-03-27 ENCOUNTER — TELEPHONE (OUTPATIENT)
Dept: ENDOCRINOLOGY | Age: 35
End: 2025-03-27

## 2025-05-23 DIAGNOSIS — Z79.4 LONG-TERM INSULIN USE (HCC): ICD-10-CM

## 2025-05-27 RX ORDER — LISINOPRIL 2.5 MG/1
2.5 TABLET ORAL DAILY
Qty: 90 TABLET | Refills: 3 | Status: SHIPPED | OUTPATIENT
Start: 2025-05-27

## 2025-05-29 ENCOUNTER — OFFICE VISIT (OUTPATIENT)
Dept: ENDOCRINOLOGY | Age: 35
End: 2025-05-29
Payer: COMMERCIAL

## 2025-05-29 VITALS
BODY MASS INDEX: 29.2 KG/M2 | WEIGHT: 197.8 LBS | OXYGEN SATURATION: 98 % | RESPIRATION RATE: 16 BRPM | SYSTOLIC BLOOD PRESSURE: 122 MMHG | TEMPERATURE: 98 F | HEART RATE: 102 BPM | DIASTOLIC BLOOD PRESSURE: 96 MMHG

## 2025-05-29 DIAGNOSIS — E55.9 VITAMIN D DEFICIENCY: ICD-10-CM

## 2025-05-29 DIAGNOSIS — E10.649 TYPE 1 DIABETES MELLITUS WITH HYPOGLYCEMIA AND WITHOUT COMA (HCC): Primary | ICD-10-CM

## 2025-05-29 DIAGNOSIS — E78.2 MIXED HYPERLIPIDEMIA: ICD-10-CM

## 2025-05-29 DIAGNOSIS — Z79.4 LONG-TERM INSULIN USE (HCC): ICD-10-CM

## 2025-05-29 LAB — HBA1C MFR BLD: 7.7 %

## 2025-05-29 PROCEDURE — 3051F HG A1C>EQUAL 7.0%<8.0%: CPT | Performed by: INTERNAL MEDICINE

## 2025-05-29 PROCEDURE — 83036 HEMOGLOBIN GLYCOSYLATED A1C: CPT | Performed by: INTERNAL MEDICINE

## 2025-05-29 PROCEDURE — 95251 CONT GLUC MNTR ANALYSIS I&R: CPT | Performed by: INTERNAL MEDICINE

## 2025-05-29 PROCEDURE — 99215 OFFICE O/P EST HI 40 MIN: CPT | Performed by: INTERNAL MEDICINE

## 2025-05-29 NOTE — PROGRESS NOTES
Willie Funk Jr. is a 35 y.o. male  seen for the management of T 1 DM .Pt diagnosed with diabetes at age 16 years and has been wearing insulin pump since diagnosis He has a very strong family hx of T1 Diabetss His mom has T1 DM as well as his younger  brother who was diagnosed at age 8 years of age.  WILLIE was followed at [facility] but apparently had poor control during his teen years up until 2023 once he switched to [employer] 780 control did improve   He was admitted to [facility] in June 2015 with elevated blood sugar he recalls no source of infection was found  In summer 2016 he was referred to a psych and he tried various antidepressant and horrible Se from those and now is not taking any thing and feels better       INTERIM:      Weight has been stable   Not walking as much   Moved into a new home in [place]           Past Medical History:   Diagnosis Date    Anxiety     Brachial plexus injury     Depression     Diabetes mellitus (HCC)     Type 1    Insulin pump in place     Type 1 diabetes mellitus (HCC)     Wrist pain, acute     R wrist       Patient Active Problem List   Diagnosis    Brachial plexus injury, left    Community acquired pneumonia    Other specified counseling    Depression    Diabetes mellitus with microalbuminuria (HCC)    Ketoacidosis in patient with type 1 diabetes mellitus (HCC)    Encounter for long-term (current) use of insulin (HCC)    Hyperglycemia    Myofascial pain    Mental disorder    Type 1 diabetes mellitus (HCC)    Mixed hyperlipidemia     Past Surgical History:   Procedure Laterality Date    ANKLE FRACTURE SURGERY Left     HERNIA REPAIR      SHOULDER SURGERY       Social History     Socioeconomic History    Marital status: Single     Spouse name: Not on file    Number of children: Not on file    Years of education: Not on file    Highest education level: Not on file   Occupational History    Not on file   Tobacco Use    Smoking status: Never    Smokeless tobacco: